# Patient Record
Sex: FEMALE | Race: WHITE | NOT HISPANIC OR LATINO
[De-identification: names, ages, dates, MRNs, and addresses within clinical notes are randomized per-mention and may not be internally consistent; named-entity substitution may affect disease eponyms.]

---

## 2017-10-19 ENCOUNTER — APPOINTMENT (OUTPATIENT)
Dept: INTERNAL MEDICINE | Facility: CLINIC | Age: 47
End: 2017-10-19
Payer: COMMERCIAL

## 2017-10-19 VITALS — TEMPERATURE: 98.5 F | SYSTOLIC BLOOD PRESSURE: 128 MMHG | DIASTOLIC BLOOD PRESSURE: 82 MMHG

## 2017-10-19 VITALS
BODY MASS INDEX: 21.21 KG/M2 | HEIGHT: 66 IN | WEIGHT: 132 LBS | DIASTOLIC BLOOD PRESSURE: 90 MMHG | TEMPERATURE: 98.3 F | SYSTOLIC BLOOD PRESSURE: 130 MMHG

## 2017-10-19 DIAGNOSIS — R92.2 INCONCLUSIVE MAMMOGRAM: ICD-10-CM

## 2017-10-19 DIAGNOSIS — Z87.898 PERSONAL HISTORY OF OTHER SPECIFIED CONDITIONS: ICD-10-CM

## 2017-10-19 DIAGNOSIS — F41.9 ANXIETY DISORDER, UNSPECIFIED: ICD-10-CM

## 2017-10-19 LAB
BILIRUB UR QL STRIP: NEGATIVE
CLARITY UR: CLEAR
COLLECTION METHOD: NORMAL
GLUCOSE UR-MCNC: NEGATIVE
HCG UR QL: 0.2 EU/DL
HGB UR QL STRIP.AUTO: NEGATIVE
KETONES UR-MCNC: NEGATIVE
LEUKOCYTE ESTERASE UR QL STRIP: NEGATIVE
NITRITE UR QL STRIP: NEGATIVE
PH UR STRIP: 6
PROT UR STRIP-MCNC: NEGATIVE
SP GR UR STRIP: 1.02

## 2017-10-19 PROCEDURE — 81003 URINALYSIS AUTO W/O SCOPE: CPT | Mod: QW

## 2017-10-19 PROCEDURE — 99214 OFFICE O/P EST MOD 30 MIN: CPT | Mod: 25

## 2017-10-19 RX ORDER — LORATADINE 5 MG/5 ML
0.05 SOLUTION, ORAL ORAL
Refills: 0 | Status: ACTIVE | COMMUNITY
Start: 2017-10-19

## 2017-10-19 RX ORDER — CHLORHEXIDINE GLUCONATE 4 %
LIQUID (ML) TOPICAL DAILY
Qty: 90 | Refills: 1 | Status: ACTIVE | COMMUNITY
Start: 2017-10-19

## 2017-10-19 RX ORDER — POLYETHYLENE GLYCOL 400 AND PROPYLENE GLYCOL 4; 3 MG/ML; MG/ML
0.4-0.3 SOLUTION/ DROPS OPHTHALMIC
Refills: 0 | Status: ACTIVE | COMMUNITY
Start: 2017-10-19

## 2018-04-11 ENCOUNTER — APPOINTMENT (OUTPATIENT)
Dept: INTERNAL MEDICINE | Facility: CLINIC | Age: 48
End: 2018-04-11
Payer: COMMERCIAL

## 2018-04-11 VITALS — TEMPERATURE: 99 F | SYSTOLIC BLOOD PRESSURE: 114 MMHG | DIASTOLIC BLOOD PRESSURE: 66 MMHG

## 2018-04-11 VITALS
TEMPERATURE: 98.4 F | WEIGHT: 127 LBS | SYSTOLIC BLOOD PRESSURE: 110 MMHG | BODY MASS INDEX: 20.41 KG/M2 | HEIGHT: 66 IN | DIASTOLIC BLOOD PRESSURE: 70 MMHG | HEART RATE: 92 BPM | OXYGEN SATURATION: 98 %

## 2018-04-11 DIAGNOSIS — Z00.00 ENCOUNTER FOR GENERAL ADULT MEDICAL EXAMINATION W/OUT ABNORMAL FINDINGS: ICD-10-CM

## 2018-04-11 DIAGNOSIS — R10.11 RIGHT UPPER QUADRANT PAIN: ICD-10-CM

## 2018-04-11 PROCEDURE — 99213 OFFICE O/P EST LOW 20 MIN: CPT

## 2018-04-11 RX ORDER — AMLODIPINE BESYLATE 2.5 MG/1
2.5 TABLET ORAL
Qty: 30 | Refills: 0 | Status: DISCONTINUED | COMMUNITY
Start: 2018-03-11 | End: 2018-04-11

## 2019-01-09 ENCOUNTER — LABORATORY RESULT (OUTPATIENT)
Age: 49
End: 2019-01-09

## 2019-01-23 ENCOUNTER — NON-APPOINTMENT (OUTPATIENT)
Age: 49
End: 2019-01-23

## 2019-01-23 ENCOUNTER — APPOINTMENT (OUTPATIENT)
Dept: INTERNAL MEDICINE | Facility: CLINIC | Age: 49
End: 2019-01-23
Payer: COMMERCIAL

## 2019-01-23 VITALS — DIASTOLIC BLOOD PRESSURE: 86 MMHG | SYSTOLIC BLOOD PRESSURE: 128 MMHG

## 2019-01-23 VITALS
WEIGHT: 126 LBS | OXYGEN SATURATION: 98 % | BODY MASS INDEX: 20.25 KG/M2 | HEART RATE: 89 BPM | TEMPERATURE: 98.3 F | HEIGHT: 66 IN | SYSTOLIC BLOOD PRESSURE: 120 MMHG | DIASTOLIC BLOOD PRESSURE: 70 MMHG

## 2019-01-23 DIAGNOSIS — Z82.49 FAMILY HISTORY OF ISCHEMIC HEART DISEASE AND OTHER DISEASES OF THE CIRCULATORY SYSTEM: ICD-10-CM

## 2019-01-23 DIAGNOSIS — R10.30 LOWER ABDOMINAL PAIN, UNSPECIFIED: ICD-10-CM

## 2019-01-23 DIAGNOSIS — Z87.898 PERSONAL HISTORY OF OTHER SPECIFIED CONDITIONS: ICD-10-CM

## 2019-01-23 DIAGNOSIS — Z84.1 FAMILY HISTORY OF DISORDERS OF KIDNEY AND URETER: ICD-10-CM

## 2019-01-23 DIAGNOSIS — Z83.79 FAMILY HISTORY OF OTHER DISEASES OF THE DIGESTIVE SYSTEM: ICD-10-CM

## 2019-01-23 DIAGNOSIS — Z83.6 FAMILY HISTORY OF OTHER DISEASES OF THE RESPIRATORY SYSTEM: ICD-10-CM

## 2019-01-23 PROCEDURE — 93000 ELECTROCARDIOGRAM COMPLETE: CPT

## 2019-01-23 PROCEDURE — 82270 OCCULT BLOOD FECES: CPT

## 2019-01-23 PROCEDURE — 99396 PREV VISIT EST AGE 40-64: CPT | Mod: 25

## 2019-01-23 NOTE — PHYSICAL EXAM
[No Acute Distress] : no acute distress [Well Nourished] : well nourished [Well Developed] : well developed [Normal Sclera/Conjunctiva] : normal sclera/conjunctiva [PERRL] : pupils equal round and reactive to light [EOMI] : extraocular movements intact [Normal Outer Ear/Nose] : the outer ears and nose were normal in appearance [Normal Oropharynx] : the oropharynx was normal [Normal TMs] : both tympanic membranes were normal [Normal Nasal Mucosa] : the nasal mucosa was normal [No JVD] : no jugular venous distention [Supple] : supple [No Lymphadenopathy] : no lymphadenopathy [No Respiratory Distress] : no respiratory distress  [Clear to Auscultation] : lungs were clear to auscultation bilaterally [Normal Rate] : normal rate  [Regular Rhythm] : with a regular rhythm [Normal S1, S2] : normal S1 and S2 [No Carotid Bruits] : no carotid bruits [Pedal Pulses Present] : the pedal pulses are present [No Edema] : there was no peripheral edema [No Extremity Clubbing/Cyanosis] : no extremity clubbing/cyanosis [Normal Appearance] : normal in appearance [No Masses] : no palpable masses [No Nipple Discharge] : no nipple discharge [No Axillary Lymphadenopathy] : no axillary lymphadenopathy [Soft] : abdomen soft [Non Tender] : non-tender [Non-distended] : non-distended [Normal Bowel Sounds] : normal bowel sounds [Normal Sphincter Tone] : normal sphincter tone [No Mass] : no mass [Stool Occult Blood] : stool negative for occult blood [Normal Supraclavicular Nodes] : no supraclavicular lymphadenopathy [Normal Axillary Nodes] : no axillary lymphadenopathy [Normal Posterior Cervical Nodes] : no posterior cervical lymphadenopathy [Normal Anterior Cervical Nodes] : no anterior cervical lymphadenopathy [No CVA Tenderness] : no CVA  tenderness [No Spinal Tenderness] : no spinal tenderness [No Joint Swelling] : no joint swelling [Grossly Normal Strength/Tone] : grossly normal strength/tone [No Rash] : no rash [Normal Gait] : normal gait [Coordination Grossly Intact] : coordination grossly intact [No Focal Deficits] : no focal deficits [Speech Grossly Normal] : speech grossly normal [Normal Affect] : the affect was normal [Alert and Oriented x3] : oriented to person, place, and time [Normal Mood] : the mood was normal [de-identified] : female in stated age,

## 2019-01-23 NOTE — HEALTH RISK ASSESSMENT
[Good] : ~his/her~ current health as good [Very Good] : ~his/her~  mood as very good [One fall no injury in past year] : Patient reported one fall in the past year without injury [0] : 2) Feeling down, depressed, or hopeless: Not at all (0) [None] : None [With Family] : lives with family [# of Members in Household ___] :  household currently consist of [unfilled] member(s) [College] : College [] :  [# Of Children ___] : has [unfilled] children [Feels Safe at Home] : Feels safe at home [Fully functional (bathing, dressing, toileting, transferring, walking, feeding)] : Fully functional (bathing, dressing, toileting, transferring, walking, feeding) [Fully functional (using the telephone, shopping, preparing meals, housekeeping, doing laundry, using] : Fully functional and needs no help or supervision to perform IADLs (using the telephone, shopping, preparing meals, housekeeping, doing laundry, using transportation, managing medications and managing finances) [Smoke Detector] : smoke detector [Carbon Monoxide Detector] : carbon monoxide detector [Seat Belt] :  uses seat belt [] : No [Change in mental status noted] : No change in mental status noted [Reports changes in hearing] : Reports no changes in hearing [Reports changes in vision] : Reports no changes in vision [Reports changes in dental health] : Reports no changes in dental health [MammogramDate] : 4/2018 [MammogramComments] : US of breast - 4/2018 [PapSmearDate] : 6/2018 [BoneDensityDate] : Never [ColonoscopyDate] : Never [de-identified] : Marianela [de-identified] : eye exam - 2016 [de-identified] : dentist - 12/2017

## 2019-01-23 NOTE — HISTORY OF PRESENT ILLNESS
[de-identified] : Pt presented for PE.  Last PE was 11/2016.  \par \par She has weaned herself off Zoloft about 2 years ago, and is feeling OK.  \par \par She has been off Bystolic for about 3-4 years.\par \par Pt was in ED for kidney stone in 10/2018.  She went to ED twice.  Kidney stone was found on US, but not on the CT scan, and pt was given ABx and increase fluid.  She d/u'ed with  1-2 weeks after that.\par \par She hasn't have any pancreatitis or abdominal since 3/2018, she very rarely drinks alcohol anymore. \par \par Pt had Flu vaccine at the urgent care center.

## 2019-01-23 NOTE — PAST MEDICAL HISTORY
[Menstruating] : menstruating [Menarche Age ____] : age at menarche was [unfilled] [Approximately ___] : the LMP was approximately [unfilled] [Excessive Bleeding] : there was excessive bleeding [Regular Cycle Intervals] : have been regular [Total Preg ___] : G[unfilled] [Live Births ___] : P[unfilled]

## 2019-01-23 NOTE — REVIEW OF SYSTEMS
[Nocturia] : nocturia [Negative] : Heme/Lymph [Fever] : no fever [Chills] : no chills [Fatigue] : no fatigue [Recent Change In Weight] : ~T no recent weight change [Chest Pain] : no chest pain [Palpitations] : no palpitations [Lower Ext Edema] : no lower extremity edema [Shortness Of Breath] : no shortness of breath [Wheezing] : no wheezing [Cough] : no cough [Dyspnea on Exertion] : no dyspnea on exertion [Abdominal Pain] : no abdominal pain [Nausea] : no nausea [Constipation] : no constipation [Diarrhea] : diarrhea [Vomiting] : no vomiting [Heartburn] : no heartburn [Melena] : no melena [Dysuria] : no dysuria [Incontinence] : no incontinence [Hematuria] : no hematuria [Joint Pain] : no joint pain [Joint Stiffness] : no joint stiffness [Joint Swelling] : no joint swelling [Muscle Pain] : no muscle pain [Back Pain] : no back pain [Itching] : no itching [Mole Changes] : no mole changes [Skin Rash] : no skin rash [Headache] : no headache [Dizziness] : no dizziness [Fainting] : no fainting [Unsteady Walking] : no ataxia [Insomnia] : no insomnia [Anxiety] : no anxiety [Depression] : no depression [Easy Bleeding] : no easy bleeding [Easy Bruising] : no easy bruising [Swollen Glands] : no swollen glands [FreeTextEntry2] : No formal exercise,  [FreeTextEntry7] : Occ mild abdominal when eating heavy food, [FreeTextEntry8] : Nocturia of 1 X per night,

## 2019-02-27 ENCOUNTER — APPOINTMENT (OUTPATIENT)
Dept: GASTROENTEROLOGY | Facility: CLINIC | Age: 49
End: 2019-02-27
Payer: COMMERCIAL

## 2019-02-27 VITALS
SYSTOLIC BLOOD PRESSURE: 110 MMHG | OXYGEN SATURATION: 95 % | TEMPERATURE: 98.4 F | BODY MASS INDEX: 20.09 KG/M2 | HEART RATE: 79 BPM | WEIGHT: 125 LBS | DIASTOLIC BLOOD PRESSURE: 80 MMHG | HEIGHT: 66 IN

## 2019-02-27 DIAGNOSIS — R14.0 ABDOMINAL DISTENSION (GASEOUS): ICD-10-CM

## 2019-02-27 PROCEDURE — 99213 OFFICE O/P EST LOW 20 MIN: CPT

## 2019-02-27 NOTE — REVIEW OF SYSTEMS
[Fever] : no fever [Chills] : no chills [As Noted in HPI] : as noted in HPI [Negative] : Musculoskeletal

## 2019-02-27 NOTE — PHYSICAL EXAM
[General Appearance - Alert] : alert [General Appearance - In No Acute Distress] : in no acute distress [Sclera] : the sclera and conjunctiva were normal [Neck Appearance] : the appearance of the neck was normal [Auscultation Breath Sounds / Voice Sounds] : lungs were clear to auscultation bilaterally [Heart Rate And Rhythm] : heart rate was normal and rhythm regular [Heart Sounds] : normal S1 and S2 [Murmurs] : no murmurs [Edema] : there was no peripheral edema [Bowel Sounds] : normal bowel sounds [Abdomen Soft] : soft [Abdomen Tenderness] : non-tender [] : no hepato-splenomegaly [Abdomen Mass (___ Cm)] : no abdominal mass palpated [Abnormal Walk] : normal gait [Musculoskeletal - Swelling] : no joint swelling seen [Skin Color & Pigmentation] : normal skin color and pigmentation [Skin Turgor] : normal skin turgor [Oriented To Time, Place, And Person] : oriented to person, place, and time [Impaired Insight] : insight and judgment were intact

## 2019-02-27 NOTE — ASSESSMENT
[FreeTextEntry1] : Patient with known history of chronic pancreatitis now complaining of episodes of lower abdominal cramps with episodes of diarrhea. She also complains of chronic bloating. Testing in the past for celiac has been negative. She has been stable as far as pancreatitis is concerned.\par \par Her examination today is unremarkable.\par \par Plan\par Check celiac panel\par Colonoscopy to be scheduled risk benefits discussed along with split prep

## 2019-03-01 LAB
ENDOMYSIUM IGA SER QL: NEGATIVE
ENDOMYSIUM IGA TITR SER: NORMAL
GLIADIN IGA SER QL: 6.9 UNITS
GLIADIN IGG SER QL: <5 UNITS
GLIADIN PEPTIDE IGA SER-ACNC: NEGATIVE
GLIADIN PEPTIDE IGG SER-ACNC: NEGATIVE
IGA SER QL IEP: 263 MG/DL
TTG IGA SER IA-ACNC: <1.2 U/ML
TTG IGA SER-ACNC: NEGATIVE
TTG IGG SER IA-ACNC: 2.7 U/ML
TTG IGG SER IA-ACNC: NEGATIVE

## 2019-04-16 ENCOUNTER — APPOINTMENT (OUTPATIENT)
Dept: GASTROENTEROLOGY | Facility: AMBULATORY MEDICAL SERVICES | Age: 49
End: 2019-04-16

## 2019-06-30 ENCOUNTER — EMERGENCY (EMERGENCY)
Facility: HOSPITAL | Age: 49
LOS: 1 days | Discharge: HOME | End: 2019-06-30
Attending: EMERGENCY MEDICINE | Admitting: EMERGENCY MEDICINE
Payer: COMMERCIAL

## 2019-06-30 VITALS
TEMPERATURE: 98 F | HEART RATE: 94 BPM | SYSTOLIC BLOOD PRESSURE: 136 MMHG | DIASTOLIC BLOOD PRESSURE: 102 MMHG | OXYGEN SATURATION: 100 % | RESPIRATION RATE: 18 BRPM

## 2019-06-30 DIAGNOSIS — R69 ILLNESS, UNSPECIFIED: ICD-10-CM

## 2019-06-30 DIAGNOSIS — F43.23 ADJUSTMENT DISORDER WITH MIXED ANXIETY AND DEPRESSED MOOD: ICD-10-CM

## 2019-06-30 LAB
ALBUMIN SERPL ELPH-MCNC: 4.6 G/DL — SIGNIFICANT CHANGE UP (ref 3.3–5)
ALP SERPL-CCNC: 50 U/L — SIGNIFICANT CHANGE UP (ref 40–120)
ALT FLD-CCNC: 25 U/L — SIGNIFICANT CHANGE UP (ref 4–33)
ANION GAP SERPL CALC-SCNC: 16 MMO/L — HIGH (ref 7–14)
APAP SERPL-MCNC: < 15 UG/ML — LOW (ref 15–25)
AST SERPL-CCNC: 31 U/L — SIGNIFICANT CHANGE UP (ref 4–32)
BASOPHILS # BLD AUTO: 0.03 K/UL — SIGNIFICANT CHANGE UP (ref 0–0.2)
BASOPHILS NFR BLD AUTO: 0.8 % — SIGNIFICANT CHANGE UP (ref 0–2)
BILIRUB SERPL-MCNC: 0.4 MG/DL — SIGNIFICANT CHANGE UP (ref 0.2–1.2)
BUN SERPL-MCNC: 18 MG/DL — SIGNIFICANT CHANGE UP (ref 7–23)
CALCIUM SERPL-MCNC: 9.2 MG/DL — SIGNIFICANT CHANGE UP (ref 8.4–10.5)
CHLORIDE SERPL-SCNC: 103 MMOL/L — SIGNIFICANT CHANGE UP (ref 98–107)
CO2 SERPL-SCNC: 25 MMOL/L — SIGNIFICANT CHANGE UP (ref 22–31)
CREAT SERPL-MCNC: 0.85 MG/DL — SIGNIFICANT CHANGE UP (ref 0.5–1.3)
EOSINOPHIL # BLD AUTO: 0.02 K/UL — SIGNIFICANT CHANGE UP (ref 0–0.5)
EOSINOPHIL NFR BLD AUTO: 0.5 % — SIGNIFICANT CHANGE UP (ref 0–6)
ETHANOL BLD-MCNC: 240 MG/DL — HIGH
GLUCOSE SERPL-MCNC: 113 MG/DL — HIGH (ref 70–99)
HCG SERPL-ACNC: < 5 MIU/ML — SIGNIFICANT CHANGE UP
HCT VFR BLD CALC: 39.7 % — SIGNIFICANT CHANGE UP (ref 34.5–45)
HGB BLD-MCNC: 12.9 G/DL — SIGNIFICANT CHANGE UP (ref 11.5–15.5)
IMM GRANULOCYTES NFR BLD AUTO: 0.3 % — SIGNIFICANT CHANGE UP (ref 0–1.5)
LYMPHOCYTES # BLD AUTO: 1.24 K/UL — SIGNIFICANT CHANGE UP (ref 1–3.3)
LYMPHOCYTES # BLD AUTO: 32.4 % — SIGNIFICANT CHANGE UP (ref 13–44)
MCHC RBC-ENTMCNC: 28.7 PG — SIGNIFICANT CHANGE UP (ref 27–34)
MCHC RBC-ENTMCNC: 32.5 % — SIGNIFICANT CHANGE UP (ref 32–36)
MCV RBC AUTO: 88.4 FL — SIGNIFICANT CHANGE UP (ref 80–100)
MONOCYTES # BLD AUTO: 0.37 K/UL — SIGNIFICANT CHANGE UP (ref 0–0.9)
MONOCYTES NFR BLD AUTO: 9.7 % — SIGNIFICANT CHANGE UP (ref 2–14)
NEUTROPHILS # BLD AUTO: 2.16 K/UL — SIGNIFICANT CHANGE UP (ref 1.8–7.4)
NEUTROPHILS NFR BLD AUTO: 56.3 % — SIGNIFICANT CHANGE UP (ref 43–77)
NRBC # FLD: 0 K/UL — SIGNIFICANT CHANGE UP (ref 0–0)
PLATELET # BLD AUTO: 227 K/UL — SIGNIFICANT CHANGE UP (ref 150–400)
PMV BLD: 9.7 FL — SIGNIFICANT CHANGE UP (ref 7–13)
POTASSIUM SERPL-MCNC: 3.9 MMOL/L — SIGNIFICANT CHANGE UP (ref 3.5–5.3)
POTASSIUM SERPL-SCNC: 3.9 MMOL/L — SIGNIFICANT CHANGE UP (ref 3.5–5.3)
PROT SERPL-MCNC: 7.8 G/DL — SIGNIFICANT CHANGE UP (ref 6–8.3)
RBC # BLD: 4.49 M/UL — SIGNIFICANT CHANGE UP (ref 3.8–5.2)
RBC # FLD: 14.5 % — SIGNIFICANT CHANGE UP (ref 10.3–14.5)
SALICYLATES SERPL-MCNC: < 5 MG/DL — LOW (ref 15–30)
SODIUM SERPL-SCNC: 144 MMOL/L — SIGNIFICANT CHANGE UP (ref 135–145)
TSH SERPL-MCNC: 0.38 UIU/ML — SIGNIFICANT CHANGE UP (ref 0.27–4.2)
WBC # BLD: 3.83 K/UL — SIGNIFICANT CHANGE UP (ref 3.8–10.5)
WBC # FLD AUTO: 3.83 K/UL — SIGNIFICANT CHANGE UP (ref 3.8–10.5)

## 2019-06-30 PROCEDURE — 99285 EMERGENCY DEPT VISIT HI MDM: CPT

## 2019-06-30 PROCEDURE — 90792 PSYCH DIAG EVAL W/MED SRVCS: CPT
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
## 2019-06-30 NOTE — ED BEHAVIORAL HEALTH ASSESSMENT NOTE - DESCRIPTION
hypertension cooperative, in good behavioral control    Vital Signs Last 24 Hrs  T(C): 36.6 (30 Jun 2019 08:09), Max: 36.6 (30 Jun 2019 08:09)  T(F): 97.8 (30 Jun 2019 08:09), Max: 97.8 (30 Jun 2019 08:09)  HR: 94 (30 Jun 2019 08:09) (94 - 94)  BP: 136/102 (30 Jun 2019 08:09) (136/102 - 136/102)  BP(mean): --  RR: 18 (30 Jun 2019 08:09) (18 - 18)  SpO2: 100% (30 Jun 2019 08:09) (100% - 100%) see HPI

## 2019-06-30 NOTE — ED ADULT NURSE NOTE - NSIMPLEMENTINTERV_GEN_ALL_ED
Implemented All Fall Risk Interventions:  [place]atin to call system. Call bell, personal items and telephone within reach. Instruct patient to call for assistance. Room bathroom lighting operational. Non-slip footwear when patient is off stretcher. Physically safe environment: no spills, clutter or unnecessary equipment. Stretcher in lowest position, wheels locked, appropriate side rails in place. Provide visual cue, wrist band, yellow gown, etc. Monitor gait and stability. Monitor for mental status changes and reorient to person, place, and time. Review medications for side effects contributing to fall risk. Reinforce activity limits and safety measures with patient and family.

## 2019-06-30 NOTE — ED ADULT NURSE NOTE - OBJECTIVE STATEMENT
Pt to behavioral health. Pt c/o depression. Pt with bruising to body. MD made aware. Pt tearful. Will continue to monitor.
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
## 2019-06-30 NOTE — ED PROVIDER NOTE - NSFOLLOWUPINSTRUCTIONS_ED_ALL_ED_FT
Please follow up with [facility] [place] [facility] crisis center. Refer to the pamphlet provided to you for phone number and address.  Please do not drink alcohol.  Please return to emergency department or call 911 if you have any thoughts of wanting to harm yourself or anyone else.   Please return for any pain, or any new/worse problems.

## 2019-06-30 NOTE — ED BEHAVIORAL HEALTH ASSESSMENT NOTE - DIFFERENTIAL
adjustment disorder with mixed anxiety and depressed mood  mdd  alcohol use disorder  substance-induced mood disorder   borderline traits

## 2019-06-30 NOTE — ED BEHAVIORAL HEALTH ASSESSMENT NOTE - RISK ASSESSMENT
Protective factors include no suicide attempts, no violence history, no access to guns, identifies reasons for living, engages in safety planning, willingness to seek help, no suicidal ideation or homicidal ideation, hopefulness for future.   Risk factors include history of substance misuse, impulsivity, marital issues.  Pt is not at acutely elevated risk of harm to self or others.

## 2019-06-30 NOTE — ED PROVIDER NOTE - PHYSICAL EXAMINATION
GEN - tearful, well appearing; A+O x3   HEAD - NC/AT   EYES- PERRL, EOMI  ENT: Airway patent, mmm, Oral cavity and pharynx normal.  NECK: Neck supple  PULMONARY - CTA b/l, symmetric breath sounds.   CARDIAC -s1s2, RRR, no M,G,R  ABDOMEN - nt/nd  EXTREMITIES - FROM  SKIN - no rash or bruising   NEUROLOGIC - alert, speech clear, no focal deficits  PSYCH -tearful at times during interview, cooperative, calm GEN - tearful, well appearing; A+O x3   HEAD - NC/AT   EYES- PERRL, EOMI  ENT: Airway patent, mmm, Oral cavity and pharynx normal.  NECK: Neck supple  PULMONARY - CTA b/l, symmetric breath sounds.   CARDIAC -s1s2, RRR, no M,G,R  ABDOMEN - nt/nd  EXTREMITIES - FROM  SKIN - multiple scattered bruises to chest/extremities. no crepitus/abrasions.   NEUROLOGIC - alert, speech clear, no focal deficits  PSYCH -tearful at times during interview, cooperative, calm
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
## 2019-06-30 NOTE — ED PROVIDER NOTE - NSFOLLOWUPCLINICS_GEN_ALL_ED_FT
[facility]  Behavioral Health  [address] [place] [address]  Phone: [phone]  Fax:   Follow Up Time:

## 2019-06-30 NOTE — ED PROVIDER NOTE - NS ED ROS FT
ROS:  GENERAL: No fever, no chills  EYES: no change in vision  HEENT: no trouble swallowing, no trouble speaking  CARDIAC: no chest pain  PULMONARY: no cough, no shortness of breath  GI: no abdominal pain  : No dysuria, no frequency, no change in appearance, or odor of urine  SKIN: no rashes  NEURO: no headache, no weakness  MSK: No joint pain  PSYCH: +si, +depression

## 2019-11-08 ENCOUNTER — APPOINTMENT (OUTPATIENT)
Dept: INTERNAL MEDICINE | Facility: CLINIC | Age: 49
End: 2019-11-08
Payer: COMMERCIAL

## 2019-11-08 VITALS
SYSTOLIC BLOOD PRESSURE: 162 MMHG | BODY MASS INDEX: 18.96 KG/M2 | DIASTOLIC BLOOD PRESSURE: 84 MMHG | HEIGHT: 66 IN | WEIGHT: 118 LBS | OXYGEN SATURATION: 98 % | HEART RATE: 84 BPM | TEMPERATURE: 98.1 F

## 2019-11-08 VITALS — SYSTOLIC BLOOD PRESSURE: 122 MMHG | DIASTOLIC BLOOD PRESSURE: 84 MMHG

## 2019-11-08 DIAGNOSIS — Z23 ENCOUNTER FOR IMMUNIZATION: ICD-10-CM

## 2019-11-08 PROCEDURE — G0008: CPT

## 2019-11-08 PROCEDURE — 90686 IIV4 VACC NO PRSV 0.5 ML IM: CPT

## 2019-11-08 PROCEDURE — 99214 OFFICE O/P EST MOD 30 MIN: CPT | Mod: 25

## 2019-11-08 RX ORDER — OMEGA-3/DHA/EPA/FISH OIL 300-1000MG
1000 CAPSULE ORAL DAILY
Refills: 0 | Status: COMPLETED | COMMUNITY
Start: 2019-01-23 | End: 2019-11-08

## 2019-11-11 NOTE — HISTORY OF PRESENT ILLNESS
[Post-hospitalization from ___ Hospital] : Post-hospitalization from [unfilled] Hospital [Discharged on ___] : discharged on [unfilled] [Admitted on: ___] : The patient was admitted on [unfilled] [Discharge Summary] : discharge summary [Pertinent Labs] : pertinent labs [Med Reconciliation] : medication reconciliation has been completed [Discharge Med List] : discharge medication list [Patient Contacted By: ____] : and contacted by [unfilled] [FreeTextEntry2] : Pt was admitted for pancreatitis last week.  She was in [place] the week before that and thought that she was eating heavier than usual.  She is scheduled for ERCP 11/18/2019.\par \par She is feeling better now.  She is back to her usual self.  She's able to go back to her usual diet.   She denied any alcohol intake.

## 2019-11-11 NOTE — PHYSICAL EXAM
[No Acute Distress] : no acute distress [Well Nourished] : well nourished [Well Developed] : well developed [No JVD] : no jugular venous distention [Supple] : supple [No Respiratory Distress] : no respiratory distress  [Clear to Auscultation] : lungs were clear to auscultation bilaterally [Normal Rate] : normal rate  [Regular Rhythm] : with a regular rhythm [Normal S1, S2] : normal S1 and S2 [No Edema] : there was no peripheral edema [Soft] : abdomen soft [No Extremity Clubbing/Cyanosis] : no extremity clubbing/cyanosis [Non Tender] : non-tender [Non-distended] : non-distended [Normal Bowel Sounds] : normal bowel sounds [Normal Supraclavicular Nodes] : no supraclavicular lymphadenopathy [Normal Posterior Cervical Nodes] : no posterior cervical lymphadenopathy [Normal Anterior Cervical Nodes] : no anterior cervical lymphadenopathy [No CVA Tenderness] : no CVA  tenderness [No Spinal Tenderness] : no spinal tenderness [No Joint Swelling] : no joint swelling [Grossly Normal Strength/Tone] : grossly normal strength/tone [Speech Grossly Normal] : speech grossly normal [Normal Affect] : the affect was normal [Alert and Oriented x3] : oriented to person, place, and time [Normal Mood] : the mood was normal [Comprehensive Foot Exam Normal] : Right and left foot were examined and both feet are normal. No ulcers in either foot. Toes are normal and with full ROM.  Normal tactile sensation with monofilament testing throughout both feet [de-identified] : female in stated age,

## 2019-12-11 ENCOUNTER — APPOINTMENT (OUTPATIENT)
Dept: INTERNAL MEDICINE | Facility: CLINIC | Age: 49
End: 2019-12-11
Payer: COMMERCIAL

## 2019-12-11 VITALS
SYSTOLIC BLOOD PRESSURE: 130 MMHG | BODY MASS INDEX: 18.8 KG/M2 | OXYGEN SATURATION: 90 % | DIASTOLIC BLOOD PRESSURE: 90 MMHG | WEIGHT: 117 LBS | TEMPERATURE: 97.9 F | HEIGHT: 66 IN | HEART RATE: 82 BPM

## 2019-12-11 VITALS — SYSTOLIC BLOOD PRESSURE: 120 MMHG | DIASTOLIC BLOOD PRESSURE: 82 MMHG

## 2019-12-11 PROCEDURE — 99214 OFFICE O/P EST MOD 30 MIN: CPT

## 2019-12-11 NOTE — ASSESSMENT
[FreeTextEntry1] : Patient is due for a physical exam at the end of January 2020, I gave her prescription to get routine blood test prior to her visit.\par \par Patient is also due for a screening colonoscopy, so we will discuss with GI regarding the timing of this procedure.

## 2019-12-11 NOTE — HISTORY OF PRESENT ILLNESS
[de-identified] : Pt presented f/u.  She had EUS with stent placed in bile duct on 12/5/2019, and was discharged home, but pt did not feel well, and went back to ED the next day, labs were unremarkable, and then discharged from ED.  Pt has been on a very light diet, and is finally feeling in the last 1-2 days.  She is starting to eat more.  No F/S/C, N/V/D.  She was given ABx for 24 hours after the EUS.  She will have a Xray in 2 weeks to see if the stent has passed.\par \par Pt had elevated BP when she was having EUS and when she was in the ED, it was 160-180/100-115 it improved after that.

## 2019-12-11 NOTE — PHYSICAL EXAM
[No Acute Distress] : no acute distress [Well Nourished] : well nourished [Well Developed] : well developed [Clear to Auscultation] : lungs were clear to auscultation bilaterally [No Respiratory Distress] : no respiratory distress  [Regular Rhythm] : with a regular rhythm [Normal Rate] : normal rate  [Normal S1, S2] : normal S1 and S2 [No Edema] : there was no peripheral edema [Soft] : abdomen soft [No Extremity Clubbing/Cyanosis] : no extremity clubbing/cyanosis [Non Tender] : non-tender [Non-distended] : non-distended [Normal Bowel Sounds] : normal bowel sounds [No Spinal Tenderness] : no spinal tenderness [No CVA Tenderness] : no CVA  tenderness [No Joint Swelling] : no joint swelling [Grossly Normal Strength/Tone] : grossly normal strength/tone [Speech Grossly Normal] : speech grossly normal [Alert and Oriented x3] : oriented to person, place, and time [Normal Affect] : the affect was normal [Normal Mood] : the mood was normal [de-identified] : female in stated age,

## 2021-03-10 ENCOUNTER — LABORATORY RESULT (OUTPATIENT)
Age: 51
End: 2021-03-10

## 2021-03-11 ENCOUNTER — LABORATORY RESULT (OUTPATIENT)
Age: 51
End: 2021-03-11

## 2021-03-12 ENCOUNTER — APPOINTMENT (OUTPATIENT)
Dept: INTERNAL MEDICINE | Facility: CLINIC | Age: 51
End: 2021-03-12
Payer: COMMERCIAL

## 2021-03-12 VITALS
WEIGHT: 118 LBS | HEART RATE: 79 BPM | DIASTOLIC BLOOD PRESSURE: 103 MMHG | HEIGHT: 66 IN | SYSTOLIC BLOOD PRESSURE: 147 MMHG | OXYGEN SATURATION: 97 % | TEMPERATURE: 97.9 F | BODY MASS INDEX: 18.96 KG/M2

## 2021-03-12 VITALS — SYSTOLIC BLOOD PRESSURE: 142 MMHG | DIASTOLIC BLOOD PRESSURE: 88 MMHG

## 2021-03-12 DIAGNOSIS — H04.129 DRY EYE SYNDROME OF UNSPECIFIED LACRIMAL GLAND: ICD-10-CM

## 2021-03-12 DIAGNOSIS — Z87.898 PERSONAL HISTORY OF OTHER SPECIFIED CONDITIONS: ICD-10-CM

## 2021-03-12 DIAGNOSIS — J30.9 ALLERGIC RHINITIS, UNSPECIFIED: ICD-10-CM

## 2021-03-12 DIAGNOSIS — R82.90 UNSPECIFIED ABNORMAL FINDINGS IN URINE: ICD-10-CM

## 2021-03-12 DIAGNOSIS — I10 ESSENTIAL (PRIMARY) HYPERTENSION: ICD-10-CM

## 2021-03-12 LAB
25(OH)D3 SERPL-MCNC: 16 NG/ML
ALBUMIN SERPL ELPH-MCNC: 4.4 G/DL
ALP BLD-CCNC: 70 U/L
ALT SERPL-CCNC: 49 U/L
AMYLASE/CREAT SERPL: 47 U/L
ANION GAP SERPL CALC-SCNC: 15 MMOL/L
APPEARANCE: CLEAR
AST SERPL-CCNC: 59 U/L
BASOPHILS # BLD AUTO: 0.04 K/UL
BASOPHILS NFR BLD AUTO: 0.9 %
BILIRUB SERPL-MCNC: 0.8 MG/DL
BILIRUBIN URINE: NEGATIVE
BLOOD URINE: ABNORMAL
BUN SERPL-MCNC: 13 MG/DL
CALCIUM SERPL-MCNC: 8.6 MG/DL
CHLORIDE SERPL-SCNC: 102 MMOL/L
CHOLEST SERPL-MCNC: 178 MG/DL
CO2 SERPL-SCNC: 23 MMOL/L
COLOR: YELLOW
CREAT SERPL-MCNC: 0.85 MG/DL
EOSINOPHIL # BLD AUTO: 0.03 K/UL
EOSINOPHIL NFR BLD AUTO: 0.7 %
FOLATE SERPL-MCNC: 3.2 NG/ML
GLUCOSE QUALITATIVE U: NEGATIVE
GLUCOSE SERPL-MCNC: 92 MG/DL
HCT VFR BLD CALC: 39.6 %
HDLC SERPL-MCNC: 68 MG/DL
HGB BLD-MCNC: 13.2 G/DL
IMM GRANULOCYTES NFR BLD AUTO: 0.2 %
KETONES URINE: NEGATIVE
LDLC SERPL CALC-MCNC: NORMAL MG/DL
LEUKOCYTE ESTERASE URINE: NEGATIVE
LPL SERPL-CCNC: 39 U/L
LYMPHOCYTES # BLD AUTO: 1.27 K/UL
LYMPHOCYTES NFR BLD AUTO: 28.7 %
MAN DIFF?: NORMAL
MCHC RBC-ENTMCNC: 30.4 PG
MCHC RBC-ENTMCNC: 33.3 GM/DL
MCV RBC AUTO: 91.2 FL
MONOCYTES # BLD AUTO: 0.31 K/UL
MONOCYTES NFR BLD AUTO: 7 %
NEUTROPHILS # BLD AUTO: 2.77 K/UL
NEUTROPHILS NFR BLD AUTO: 62.5 %
NITRITE URINE: NEGATIVE
NONHDLC SERPL-MCNC: 110 MG/DL
PH URINE: 6.5
PLATELET # BLD AUTO: 221 K/UL
POTASSIUM SERPL-SCNC: 3.6 MMOL/L
PROT SERPL-MCNC: 7.2 G/DL
PROTEIN URINE: ABNORMAL
RBC # BLD: 4.34 M/UL
RBC # FLD: 13.9 %
SODIUM SERPL-SCNC: 140 MMOL/L
SPECIFIC GRAVITY URINE: 1.03
T4 FREE SERPL-MCNC: 1 NG/DL
TRIGL SERPL-MCNC: 684 MG/DL
TSH SERPL-ACNC: 0.65 UIU/ML
UROBILINOGEN URINE: NORMAL
VIT B12 SERPL-MCNC: 327 PG/ML
WBC # FLD AUTO: 4.43 K/UL

## 2021-03-12 PROCEDURE — 99396 PREV VISIT EST AGE 40-64: CPT | Mod: 25

## 2021-03-12 PROCEDURE — 82270 OCCULT BLOOD FECES: CPT

## 2021-03-12 PROCEDURE — 99072 ADDL SUPL MATRL&STAF TM PHE: CPT

## 2021-03-12 RX ORDER — SODIUM SULFATE, POTASSIUM SULFATE, MAGNESIUM SULFATE 17.5; 3.13; 1.6 G/ML; G/ML; G/ML
17.5-3.13-1.6 SOLUTION, CONCENTRATE ORAL
Qty: 1 | Refills: 0 | Status: COMPLETED | COMMUNITY
Start: 2019-02-27 | End: 2021-03-12

## 2021-03-12 RX ORDER — CHLORHEXIDINE GLUCONATE 4 %
LIQUID (ML) TOPICAL
Qty: 90 | Refills: 1 | Status: COMPLETED | COMMUNITY
Start: 2021-03-12 | End: 2021-03-12

## 2021-03-12 RX ORDER — AMLODIPINE BESYLATE 10 MG/1
10 TABLET ORAL
Qty: 90 | Refills: 1 | Status: ACTIVE | COMMUNITY
Start: 2019-11-08 | End: 1900-01-01

## 2021-03-12 NOTE — PAST MEDICAL HISTORY
[Perimenopausal] : perimenopausal [Menarche Age ____] : age at menarche was [unfilled] [Approximately ___] : the LMP was approximately [unfilled] [Total Preg ___] : G[unfilled] [Live Births ___] : P[unfilled]

## 2021-03-15 NOTE — DATA REVIEWED
[FreeTextEntry1] : Derm - never\par \par EKG - deferred, pt had EKG w/n the last few months at the hospital.

## 2021-03-15 NOTE — HISTORY OF PRESENT ILLNESS
[FreeTextEntry1] : Pt presented for PE.  Last PE was 1/2019. [de-identified] : Pt was hospitalized a few times in the last few months.  She had colon infection, and was treated with ABx.  She is due for a repeat colonoscopy, and also needs a DEXA scan.  She needs to f/u with GI.  \par \par Pt denied ETOH again.  She feels that her abdominal pain is related to diet.  She has tried to change her diet.   \par \par She had cholecystectomy in 3/2020.  She missed the flu shot in the past year.\par \par Pt was well and did not get sick throughout the COVID pandemic.

## 2021-03-15 NOTE — HEALTH RISK ASSESSMENT
[Very Good] : ~his/her~  mood as very good [Never (0 pts)] : Never (0 points) [No] : In the past 12 months have you used drugs other than those required for medical reasons? No [One fall no injury in past year] : Patient reported one fall in the past year without injury [0] : 2) Feeling down, depressed, or hopeless: Not at all (0) [None] : None [With Family] : lives with family [# of Members in Household ___] :  household currently consist of [unfilled] member(s) [College] : College [] :  [# Of Children ___] : has [unfilled] children [Feels Safe at Home] : Feels safe at home [Fully functional (bathing, dressing, toileting, transferring, walking, feeding)] : Fully functional (bathing, dressing, toileting, transferring, walking, feeding) [Fully functional (using the telephone, shopping, preparing meals, housekeeping, doing laundry, using] : Fully functional and needs no help or supervision to perform IADLs (using the telephone, shopping, preparing meals, housekeeping, doing laundry, using transportation, managing medications and managing finances) [Smoke Detector] : smoke detector [Carbon Monoxide Detector] : carbon monoxide detector [Seat Belt] :  uses seat belt [] : No [de-identified] : walking abut 4 days per week, [[id number]] : 0 [EyeExamDate] : 02/21 [Change in mental status noted] : No change in mental status noted [Reports changes in hearing] : Reports no changes in hearing [Reports changes in vision] : Reports no changes in vision [Reports changes in dental health] : Reports no changes in dental health [MammogramDate] : 09/20 [MammogramComments] : US breast - 9/2020 [PapSmearDate] : 09/20 [ColonoscopyDate] : Never [de-identified] : Marianela [de-identified] : dentist - 3/2020

## 2021-03-15 NOTE — PHYSICAL EXAM
[No Acute Distress] : no acute distress [Well Nourished] : well nourished [Well Developed] : well developed [Normal Sclera/Conjunctiva] : normal sclera/conjunctiva [PERRL] : pupils equal round and reactive to light [EOMI] : extraocular movements intact [Normal Outer Ear/Nose] : the outer ears and nose were normal in appearance [Normal Oropharynx] : the oropharynx was normal [Normal TMs] : both tympanic membranes were normal [Normal Nasal Mucosa] : the nasal mucosa was normal [No JVD] : no jugular venous distention [No Lymphadenopathy] : no lymphadenopathy [Supple] : supple [No Respiratory Distress] : no respiratory distress  [Clear to Auscultation] : lungs were clear to auscultation bilaterally [Normal Rate] : normal rate  [Regular Rhythm] : with a regular rhythm [Normal S1, S2] : normal S1 and S2 [No Carotid Bruits] : no carotid bruits [Pedal Pulses Present] : the pedal pulses are present [No Edema] : there was no peripheral edema [No Extremity Clubbing/Cyanosis] : no extremity clubbing/cyanosis [Normal Appearance] : normal in appearance [No Masses] : no palpable masses [No Nipple Discharge] : no nipple discharge [No Axillary Lymphadenopathy] : no axillary lymphadenopathy [Soft] : abdomen soft [Non Tender] : non-tender [Non-distended] : non-distended [Normal Bowel Sounds] : normal bowel sounds [Normal Sphincter Tone] : normal sphincter tone [No Mass] : no mass [Normal Supraclavicular Nodes] : no supraclavicular lymphadenopathy [Normal Axillary Nodes] : no axillary lymphadenopathy [Normal Posterior Cervical Nodes] : no posterior cervical lymphadenopathy [Normal Anterior Cervical Nodes] : no anterior cervical lymphadenopathy [No CVA Tenderness] : no CVA  tenderness [No Spinal Tenderness] : no spinal tenderness [No Joint Swelling] : no joint swelling [Grossly Normal Strength/Tone] : grossly normal strength/tone [No Rash] : no rash [Coordination Grossly Intact] : coordination grossly intact [No Focal Deficits] : no focal deficits [Normal Gait] : normal gait [Deep Tendon Reflexes (DTR)] : deep tendon reflexes were 2+ and symmetric [Speech Grossly Normal] : speech grossly normal [Normal Affect] : the affect was normal [Alert and Oriented x3] : oriented to person, place, and time [Normal Mood] : the mood was normal [Stool Occult Blood] : stool negative for occult blood [de-identified] : female in stated age,

## 2021-10-06 PROBLEM — I10 ESSENTIAL HYPERTENSION: Status: ACTIVE | Noted: 2017-10-19

## 2021-12-22 ENCOUNTER — APPOINTMENT (OUTPATIENT)
Dept: INTERNAL MEDICINE | Facility: CLINIC | Age: 51
End: 2021-12-22
Payer: COMMERCIAL

## 2021-12-22 DIAGNOSIS — U07.1 COVID-19: ICD-10-CM

## 2021-12-22 DIAGNOSIS — Z00.00 ENCOUNTER FOR GENERAL ADULT MEDICAL EXAMINATION W/OUT ABNORMAL FINDINGS: ICD-10-CM

## 2021-12-22 PROCEDURE — 99442: CPT

## 2021-12-22 NOTE — PHYSICAL EXAM
[No Acute Distress] : no acute distress [Speech Grossly Normal] : speech grossly normal [Normal Affect] : the affect was normal [Alert and Oriented x3] : oriented to person, place, and time [Normal Mood] : the mood was normal [de-identified] : Unable to examine patient due to telephonic encounter,

## 2021-12-22 NOTE — HISTORY OF PRESENT ILLNESS
[Home] : at home, [unfilled] , at the time of the visit. [Medical Office: ([facility])___] : at the medical office located in  [Verbal consent obtained from patient] : the patient, [unfilled] [FreeTextEntry1] : Patient initially presented for this telephonic encounter because she had COVID-19 infection last month, and she wanted to know when she can have booster vaccine as well as other questions regarding COVID-19.  However, the patient had repeat testing for COVID-19 yesterday, as she plans on joining her parents for a large house holiday for [place] celebration, she was found to have positive test again.\par \par Patient was not feeling well a couple of days after Thanksgiving, she and most of her family members were tested positive for COVID-19.  He also had mild illness and decline which recovered completely.  Her father works for a company that allows COVID-19 testing every week, she was also tested every week and was positive for 2 to 3 weeks, but she was tested negative last week.  She has pretty much recovered from the previous illness, except that her sense of smell and taste were not completely back to normal.\par \par Patient went for repeat COVID-19 testing yesterday, as she plans to join her parents for a [place] party.  She found out that she was tested positive again.  She denied any new symptoms at present, except for maybe some mild rhinorrhea. [de-identified] : Pt had COVID infection 2 days after Thanksgiving.  She had mild illness, and recovered completely, except she still has lost of sense of taste and smell.  She was tested negative for COVID last week, and she was  planning to go to see her parents for [place], and then so she had another COVID tested.

## 2022-02-07 ENCOUNTER — NON-APPOINTMENT (OUTPATIENT)
Age: 52
End: 2022-02-07

## 2022-02-08 ENCOUNTER — APPOINTMENT (OUTPATIENT)
Dept: GASTROENTEROLOGY | Facility: CLINIC | Age: 52
End: 2022-02-08
Payer: COMMERCIAL

## 2022-02-08 VITALS
HEIGHT: 66 IN | SYSTOLIC BLOOD PRESSURE: 110 MMHG | WEIGHT: 126 LBS | BODY MASS INDEX: 20.25 KG/M2 | DIASTOLIC BLOOD PRESSURE: 70 MMHG | TEMPERATURE: 97.3 F

## 2022-02-08 DIAGNOSIS — Z01.818 ENCOUNTER FOR OTHER PREPROCEDURAL EXAMINATION: ICD-10-CM

## 2022-02-08 DIAGNOSIS — Z87.19 PERSONAL HISTORY OF OTHER DISEASES OF THE DIGESTIVE SYSTEM: ICD-10-CM

## 2022-02-08 DIAGNOSIS — Z11.52 ENCOUNTER FOR SCREENING FOR COVID-19: ICD-10-CM

## 2022-02-08 DIAGNOSIS — Z86.79 PERSONAL HISTORY OF OTHER DISEASES OF THE CIRCULATORY SYSTEM: ICD-10-CM

## 2022-02-08 PROCEDURE — 99072 ADDL SUPL MATRL&STAF TM PHE: CPT

## 2022-02-08 PROCEDURE — 99203 OFFICE O/P NEW LOW 30 MIN: CPT | Mod: 25

## 2022-02-08 RX ORDER — SODIUM PICOSULFATE, MAGNESIUM OXIDE, AND ANHYDROUS CITRIC ACID 10; 3.5; 12 MG/160ML; G/160ML; G/160ML
10-3.5-12 MG-GM LIQUID ORAL
Qty: 1 | Refills: 0 | Status: COMPLETED | COMMUNITY
Start: 2022-02-08 | End: 2022-02-10

## 2022-02-08 NOTE — ASSESSMENT
[FreeTextEntry1] : Impression: Probable IBS. Probable GERD with functional component to dyspeptic symptoms. CT findings probably artifactual. Patient should have colonoscopy to rule out pathology given symptoms.\par \par Plan: Schedule colonoscopy with Clenpiq prep

## 2022-02-08 NOTE — HISTORY OF PRESENT ILLNESS
[Heartburn] : stable heartburn [Nausea] : stable nausea [Vomiting] : resolved vomiting [Diarrhea] : stable diarrhea [Constipation] : denies constipation [Yellow Skin Or Eyes (Jaundice)] : denies jaundice [Abdominal Pain] : stable abdominal pain [Abdominal Swelling] : denies abdominal swelling [Rectal Pain] : denies rectal pain [GERD] : gastroesophageal reflux disease [Peptic Ulcer Disease] : no peptic ulcer disease [Pancreatitis] : pancreatitis [Cholelithiasis] : cholelithiasis [Inflammatory Bowel Disease] : no inflammatory bowel disease [Irritable Bowel Syndrome] : irritable bowel syndrome [Alcohol Abuse] : alcohol abuse [Abdominal Surgery] : abdominal surgery [Cholecystectomy] : cholecystectomy [de-identified] : 51-year-old female with history of recurrent pancreatitis secondary to alcohol abuse, chronic dyspeptic symptoms with recurrent abdominal pain nausea and vomiting went to the emergency room 3 days ago with right upper quadrant pain. Labs unremarkable. CT unremarkable except for questionable mild thickened segment of right colon. Patient has no family history of colon cancer or polyps both scheduled for colonoscopy in 2019 to evaluate symptoms. She never had the exam because she became ill prior. She had COVID late last year and has been experiencing some intermittent diarrhea since that time. She denies melena or hematochezia. Denies weight loss. Not taking any GI medications currently.\par \par Had EGD/EUS a couple of years ago to evaluate the pancreas. There is a small sidebranch IPMN is some calcifications in the tail but no criteria for chronic pancreatitis

## 2022-02-14 LAB — SARS-COV-2 N GENE NPH QL NAA+PROBE: DETECTED

## 2022-02-16 ENCOUNTER — APPOINTMENT (OUTPATIENT)
Dept: GASTROENTEROLOGY | Facility: AMBULATORY MEDICAL SERVICES | Age: 52
End: 2022-02-16

## 2022-03-18 ENCOUNTER — APPOINTMENT (OUTPATIENT)
Dept: GASTROENTEROLOGY | Facility: AMBULATORY MEDICAL SERVICES | Age: 52
End: 2022-03-18
Payer: SELF-PAY

## 2022-03-18 PROCEDURE — 45378 DIAGNOSTIC COLONOSCOPY: CPT | Mod: 33

## 2022-05-12 ENCOUNTER — NON-APPOINTMENT (OUTPATIENT)
Age: 52
End: 2022-05-12

## 2022-09-21 ENCOUNTER — NON-APPOINTMENT (OUTPATIENT)
Age: 52
End: 2022-09-21

## 2022-09-29 ENCOUNTER — APPOINTMENT (OUTPATIENT)
Dept: GASTROENTEROLOGY | Facility: CLINIC | Age: 52
End: 2022-09-29

## 2022-09-29 VITALS
HEART RATE: 89 BPM | TEMPERATURE: 96.9 F | DIASTOLIC BLOOD PRESSURE: 60 MMHG | SYSTOLIC BLOOD PRESSURE: 110 MMHG | OXYGEN SATURATION: 98 % | WEIGHT: 117.5 LBS | HEIGHT: 66 IN | BODY MASS INDEX: 18.88 KG/M2

## 2022-09-29 DIAGNOSIS — Z78.9 OTHER SPECIFIED HEALTH STATUS: ICD-10-CM

## 2022-09-29 DIAGNOSIS — R93.3 ABNORMAL FINDINGS ON DIAGNOSTIC IMAGING OF OTHER PARTS OF DIGESTIVE TRACT: ICD-10-CM

## 2022-09-29 DIAGNOSIS — A09 INFECTIOUS GASTROENTERITIS AND COLITIS, UNSPECIFIED: ICD-10-CM

## 2022-09-29 DIAGNOSIS — R19.8 OTHER SPECIFIED SYMPTOMS AND SIGNS INVOLVING THE DIGESTIVE SYSTEM AND ABDOMEN: ICD-10-CM

## 2022-09-29 PROCEDURE — 99214 OFFICE O/P EST MOD 30 MIN: CPT

## 2022-09-29 NOTE — HISTORY OF PRESENT ILLNESS
[FreeTextEntry1] : 03/18/2022: Normal, 10-year follow-up [de-identified] : Multiple.  Most recently 1 week ago.  Status post cholecystectomy, minimal scattered small pancreatic calcifications.  Nonobstructing 2 mm right kidney stone, otherwise unremarkable

## 2022-09-29 NOTE — ASSESSMENT
[FreeTextEntry1] : Impression: Acute infectious gastroenteritis resolved.  No evidence of recurrent pancreatitis.  Recent colonoscopy unremarkable.  Currently asymptomatic.\par \par Plan: No further work-up or intervention required.  Follow-up as needed

## 2022-11-02 NOTE — ED PROVIDER NOTE - PROGRESS NOTE DETAILS
patient calm, cooperative, evaluated by psych, cleared for dc, denying si/hi at this time. WIll obs until sober given bal 240. Approx 3pm.
Normal for race

## 2023-01-21 ENCOUNTER — EMERGENCY (EMERGENCY)
Facility: HOSPITAL | Age: 53
LOS: 1 days | Discharge: HOME | End: 2023-01-21
Attending: STUDENT IN AN ORGANIZED HEALTH CARE EDUCATION/TRAINING PROGRAM | Admitting: STUDENT IN AN ORGANIZED HEALTH CARE EDUCATION/TRAINING PROGRAM
Payer: COMMERCIAL

## 2023-01-21 VITALS
HEIGHT: 66 IN | SYSTOLIC BLOOD PRESSURE: 145 MMHG | OXYGEN SATURATION: 100 % | DIASTOLIC BLOOD PRESSURE: 105 MMHG | TEMPERATURE: 97 F | WEIGHT: 115.08 LBS | RESPIRATION RATE: 20 BRPM | HEART RATE: 120 BPM

## 2023-01-21 VITALS
HEART RATE: 98 BPM | RESPIRATION RATE: 18 BRPM | TEMPERATURE: 98 F | SYSTOLIC BLOOD PRESSURE: 133 MMHG | DIASTOLIC BLOOD PRESSURE: 77 MMHG | OXYGEN SATURATION: 98 %

## 2023-01-21 DIAGNOSIS — Z98.891 HISTORY OF UTERINE SCAR FROM PREVIOUS SURGERY: Chronic | ICD-10-CM

## 2023-01-21 DIAGNOSIS — Z90.49 ACQUIRED ABSENCE OF OTHER SPECIFIED PARTS OF DIGESTIVE TRACT: Chronic | ICD-10-CM

## 2023-01-21 LAB
ALBUMIN SERPL ELPH-MCNC: 4.1 G/DL — SIGNIFICANT CHANGE UP (ref 3.3–5)
ALP SERPL-CCNC: 70 U/L — SIGNIFICANT CHANGE UP (ref 30–120)
ALT FLD-CCNC: 33 U/L DA — SIGNIFICANT CHANGE UP (ref 10–60)
ANION GAP SERPL CALC-SCNC: 10 MMOL/L — SIGNIFICANT CHANGE UP (ref 5–17)
APTT BLD: 24.8 SEC — LOW (ref 27.5–35.5)
AST SERPL-CCNC: 36 U/L — SIGNIFICANT CHANGE UP (ref 10–40)
BASOPHILS # BLD AUTO: 0.03 K/UL — SIGNIFICANT CHANGE UP (ref 0–0.2)
BASOPHILS # BLD AUTO: 0.04 K/UL — SIGNIFICANT CHANGE UP (ref 0–0.2)
BASOPHILS NFR BLD AUTO: 0.3 % — SIGNIFICANT CHANGE UP (ref 0–2)
BASOPHILS NFR BLD AUTO: 0.3 % — SIGNIFICANT CHANGE UP (ref 0–2)
BILIRUB SERPL-MCNC: 0.8 MG/DL — SIGNIFICANT CHANGE UP (ref 0.2–1.2)
BLD GP AB SCN SERPL QL: SIGNIFICANT CHANGE UP
BUN SERPL-MCNC: 20 MG/DL — SIGNIFICANT CHANGE UP (ref 7–23)
CALCIUM SERPL-MCNC: 9.2 MG/DL — SIGNIFICANT CHANGE UP (ref 8.4–10.5)
CHLORIDE SERPL-SCNC: 102 MMOL/L — SIGNIFICANT CHANGE UP (ref 96–108)
CO2 SERPL-SCNC: 24 MMOL/L — SIGNIFICANT CHANGE UP (ref 22–31)
CREAT SERPL-MCNC: 1.05 MG/DL — SIGNIFICANT CHANGE UP (ref 0.5–1.3)
EGFR: 65 ML/MIN/1.73M2 — SIGNIFICANT CHANGE UP
EOSINOPHIL # BLD AUTO: 0 K/UL — SIGNIFICANT CHANGE UP (ref 0–0.5)
EOSINOPHIL # BLD AUTO: 0.01 K/UL — SIGNIFICANT CHANGE UP (ref 0–0.5)
EOSINOPHIL NFR BLD AUTO: 0 % — SIGNIFICANT CHANGE UP (ref 0–6)
EOSINOPHIL NFR BLD AUTO: 0.1 % — SIGNIFICANT CHANGE UP (ref 0–6)
GLUCOSE SERPL-MCNC: 118 MG/DL — HIGH (ref 70–99)
HCG SERPL-ACNC: 5 MIU/ML — SIGNIFICANT CHANGE UP
HCG UR QL: NEGATIVE — SIGNIFICANT CHANGE UP
HCT VFR BLD CALC: 31.8 % — LOW (ref 34.5–45)
HCT VFR BLD CALC: 38.1 % — SIGNIFICANT CHANGE UP (ref 34.5–45)
HGB BLD-MCNC: 10.3 G/DL — LOW (ref 11.5–15.5)
HGB BLD-MCNC: 12.2 G/DL — SIGNIFICANT CHANGE UP (ref 11.5–15.5)
IMM GRANULOCYTES NFR BLD AUTO: 0.3 % — SIGNIFICANT CHANGE UP (ref 0–0.9)
IMM GRANULOCYTES NFR BLD AUTO: 0.4 % — SIGNIFICANT CHANGE UP (ref 0–0.9)
INR BLD: 1.03 RATIO — SIGNIFICANT CHANGE UP (ref 0.88–1.16)
LIDOCAIN IGE QN: 69 U/L — LOW (ref 73–393)
LYMPHOCYTES # BLD AUTO: 1.29 K/UL — SIGNIFICANT CHANGE UP (ref 1–3.3)
LYMPHOCYTES # BLD AUTO: 1.6 K/UL — SIGNIFICANT CHANGE UP (ref 1–3.3)
LYMPHOCYTES # BLD AUTO: 11.2 % — LOW (ref 13–44)
LYMPHOCYTES # BLD AUTO: 15.3 % — SIGNIFICANT CHANGE UP (ref 13–44)
MCHC RBC-ENTMCNC: 26.1 PG — LOW (ref 27–34)
MCHC RBC-ENTMCNC: 26.5 PG — LOW (ref 27–34)
MCHC RBC-ENTMCNC: 32 GM/DL — SIGNIFICANT CHANGE UP (ref 32–36)
MCHC RBC-ENTMCNC: 32.4 GM/DL — SIGNIFICANT CHANGE UP (ref 32–36)
MCV RBC AUTO: 81.6 FL — SIGNIFICANT CHANGE UP (ref 80–100)
MCV RBC AUTO: 81.7 FL — SIGNIFICANT CHANGE UP (ref 80–100)
MONOCYTES # BLD AUTO: 0.7 K/UL — SIGNIFICANT CHANGE UP (ref 0–0.9)
MONOCYTES # BLD AUTO: 0.7 K/UL — SIGNIFICANT CHANGE UP (ref 0–0.9)
MONOCYTES NFR BLD AUTO: 6.1 % — SIGNIFICANT CHANGE UP (ref 2–14)
MONOCYTES NFR BLD AUTO: 6.7 % — SIGNIFICANT CHANGE UP (ref 2–14)
NEUTROPHILS # BLD AUTO: 8.07 K/UL — HIGH (ref 1.8–7.4)
NEUTROPHILS # BLD AUTO: 9.44 K/UL — HIGH (ref 1.8–7.4)
NEUTROPHILS NFR BLD AUTO: 77.2 % — HIGH (ref 43–77)
NEUTROPHILS NFR BLD AUTO: 82.1 % — HIGH (ref 43–77)
NRBC # BLD: 0 /100 WBCS — SIGNIFICANT CHANGE UP (ref 0–0)
NRBC # BLD: 0 /100 WBCS — SIGNIFICANT CHANGE UP (ref 0–0)
PLATELET # BLD AUTO: 203 K/UL — SIGNIFICANT CHANGE UP (ref 150–400)
PLATELET # BLD AUTO: 272 K/UL — SIGNIFICANT CHANGE UP (ref 150–400)
POTASSIUM SERPL-MCNC: 3.3 MMOL/L — LOW (ref 3.5–5.3)
POTASSIUM SERPL-SCNC: 3.3 MMOL/L — LOW (ref 3.5–5.3)
PROT SERPL-MCNC: 8.3 G/DL — SIGNIFICANT CHANGE UP (ref 6–8.3)
PROTHROM AB SERPL-ACNC: 11.9 SEC — SIGNIFICANT CHANGE UP (ref 10.5–13.4)
RBC # BLD: 3.89 M/UL — SIGNIFICANT CHANGE UP (ref 3.8–5.2)
RBC # BLD: 4.67 M/UL — SIGNIFICANT CHANGE UP (ref 3.8–5.2)
RBC # FLD: 15.7 % — HIGH (ref 10.3–14.5)
RBC # FLD: 15.8 % — HIGH (ref 10.3–14.5)
SARS-COV-2 RNA SPEC QL NAA+PROBE: SIGNIFICANT CHANGE UP
SODIUM SERPL-SCNC: 136 MMOL/L — SIGNIFICANT CHANGE UP (ref 135–145)
WBC # BLD: 10.45 K/UL — SIGNIFICANT CHANGE UP (ref 3.8–10.5)
WBC # BLD: 11.5 K/UL — HIGH (ref 3.8–10.5)
WBC # FLD AUTO: 10.45 K/UL — SIGNIFICANT CHANGE UP (ref 3.8–10.5)
WBC # FLD AUTO: 11.5 K/UL — HIGH (ref 3.8–10.5)

## 2023-01-21 PROCEDURE — 85610 PROTHROMBIN TIME: CPT

## 2023-01-21 PROCEDURE — 99285 EMERGENCY DEPT VISIT HI MDM: CPT

## 2023-01-21 PROCEDURE — 83690 ASSAY OF LIPASE: CPT

## 2023-01-21 PROCEDURE — 86850 RBC ANTIBODY SCREEN: CPT

## 2023-01-21 PROCEDURE — 87635 SARS-COV-2 COVID-19 AMP PRB: CPT

## 2023-01-21 PROCEDURE — 74177 CT ABD & PELVIS W/CONTRAST: CPT | Mod: 26,MA

## 2023-01-21 PROCEDURE — 99284 EMERGENCY DEPT VISIT MOD MDM: CPT | Mod: 25

## 2023-01-21 PROCEDURE — 36415 COLL VENOUS BLD VENIPUNCTURE: CPT

## 2023-01-21 PROCEDURE — 86901 BLOOD TYPING SEROLOGIC RH(D): CPT

## 2023-01-21 PROCEDURE — 85730 THROMBOPLASTIN TIME PARTIAL: CPT

## 2023-01-21 PROCEDURE — 86900 BLOOD TYPING SEROLOGIC ABO: CPT

## 2023-01-21 PROCEDURE — 96361 HYDRATE IV INFUSION ADD-ON: CPT

## 2023-01-21 PROCEDURE — 80053 COMPREHEN METABOLIC PANEL: CPT

## 2023-01-21 PROCEDURE — 76830 TRANSVAGINAL US NON-OB: CPT

## 2023-01-21 PROCEDURE — 96374 THER/PROPH/DIAG INJ IV PUSH: CPT | Mod: XU

## 2023-01-21 PROCEDURE — 76856 US EXAM PELVIC COMPLETE: CPT | Mod: 26

## 2023-01-21 PROCEDURE — 76830 TRANSVAGINAL US NON-OB: CPT | Mod: 26

## 2023-01-21 PROCEDURE — 76856 US EXAM PELVIC COMPLETE: CPT

## 2023-01-21 PROCEDURE — 96375 TX/PRO/DX INJ NEW DRUG ADDON: CPT

## 2023-01-21 PROCEDURE — 84702 CHORIONIC GONADOTROPIN TEST: CPT

## 2023-01-21 PROCEDURE — 74177 CT ABD & PELVIS W/CONTRAST: CPT | Mod: MA

## 2023-01-21 PROCEDURE — 85025 COMPLETE CBC W/AUTO DIFF WBC: CPT

## 2023-01-21 PROCEDURE — 81025 URINE PREGNANCY TEST: CPT

## 2023-01-21 RX ORDER — POTASSIUM CHLORIDE 20 MEQ
40 PACKET (EA) ORAL ONCE
Refills: 0 | Status: COMPLETED | OUTPATIENT
Start: 2023-01-21 | End: 2023-01-21

## 2023-01-21 RX ORDER — SODIUM CHLORIDE 9 MG/ML
1000 INJECTION INTRAMUSCULAR; INTRAVENOUS; SUBCUTANEOUS ONCE
Refills: 0 | Status: COMPLETED | OUTPATIENT
Start: 2023-01-21 | End: 2023-01-21

## 2023-01-21 RX ORDER — ONDANSETRON 8 MG/1
4 TABLET, FILM COATED ORAL ONCE
Refills: 0 | Status: COMPLETED | OUTPATIENT
Start: 2023-01-21 | End: 2023-01-21

## 2023-01-21 RX ORDER — ACETAMINOPHEN 500 MG
1000 TABLET ORAL ONCE
Refills: 0 | Status: COMPLETED | OUTPATIENT
Start: 2023-01-21 | End: 2023-01-21

## 2023-01-21 RX ORDER — TRANEXAMIC ACID 100 MG/ML
2 INJECTION, SOLUTION INTRAVENOUS
Qty: 30 | Refills: 0
Start: 2023-01-21 | End: 2023-01-25

## 2023-01-21 RX ORDER — AMLODIPINE BESYLATE 2.5 MG/1
0 TABLET ORAL
Qty: 0 | Refills: 0 | DISCHARGE

## 2023-01-21 RX ORDER — KETOROLAC TROMETHAMINE 30 MG/ML
30 SYRINGE (ML) INJECTION ONCE
Refills: 0 | Status: DISCONTINUED | OUTPATIENT
Start: 2023-01-21 | End: 2023-01-21

## 2023-01-21 RX ADMIN — Medication 1000 MILLIGRAM(S): at 05:16

## 2023-01-21 RX ADMIN — SODIUM CHLORIDE 1000 MILLILITER(S): 9 INJECTION INTRAMUSCULAR; INTRAVENOUS; SUBCUTANEOUS at 04:19

## 2023-01-21 RX ADMIN — Medication 30 MILLIGRAM(S): at 03:03

## 2023-01-21 RX ADMIN — SODIUM CHLORIDE 1000 MILLILITER(S): 9 INJECTION INTRAMUSCULAR; INTRAVENOUS; SUBCUTANEOUS at 02:45

## 2023-01-21 RX ADMIN — ONDANSETRON 4 MILLIGRAM(S): 8 TABLET, FILM COATED ORAL at 02:46

## 2023-01-21 RX ADMIN — Medication 1000 MILLIGRAM(S): at 05:01

## 2023-01-21 RX ADMIN — Medication 30 MILLIGRAM(S): at 02:47

## 2023-01-21 RX ADMIN — Medication 40 MILLIEQUIVALENT(S): at 03:07

## 2023-01-21 RX ADMIN — Medication 400 MILLIGRAM(S): at 04:46

## 2023-01-21 NOTE — ED ADULT NURSE NOTE - CHPI ED NUR SYMPTOMS POS
c/o heavy vaginal bleeding x 1.5 hours. "passing clots". Also c/o abd pain radiating to back. last "normal" menstrual period 1/1/23./CRAMPS/PELVIC PAIN

## 2023-01-21 NOTE — ED PROVIDER NOTE - PROGRESS NOTE DETAILS
Results of work up d/w patient and copies of all reports given.  Discussed recommendations from Dr. Taylor.  HR 88, BP stable.  Patient will take Lysteda and call her Gyn today.  Understands to return to the ER for any changes.

## 2023-01-21 NOTE — ED PROVIDER NOTE - PROVIDER TOKENS
PROVIDER:[TOKEN:[[id number]]],PROVIDER:[TOKEN:[[id number]]],PROVIDER:[TOKEN:[[id number]]]

## 2023-01-21 NOTE — ED PROVIDER NOTE - NSICDXPASTSURGICALHX_GEN_ALL_CORE_FT
PAST SURGICAL HISTORY:  H/O  section     History of appendectomy     History of cholecystectomy

## 2023-01-21 NOTE — ED PROVIDER NOTE - CLINICAL SUMMARY MEDICAL DECISION MAKING FREE TEXT BOX
52 year old female p/w sudden onset of heavy vaginal bleeding x 1.5 hours.  Patient reports passing large clots and feeling dizzy.  Check labs, H/H, hydrate, obtain pelvic ultrasound, consult Gyn as needed.

## 2023-01-21 NOTE — ED PROVIDER NOTE - OBJECTIVE STATEMENT
52 year old female with a history of HTN presents with vaginal bleeding.  Patient states she woke up 1.5 hours ago with heavy bleeding and noticed that she started to pass large clots.  She used several pads and eventually had to use towel, then started to feel dizzy.  While driving to the ED, she developed lower abdominal cramping. Patient states LMP 1/1/23.  She has no history of fibroids or DUB.  She still menstruates regularly.  She saw her Gyn within the last 6 months and had a normal pap smear.  PMD Cande Escobar, Gyn Mai Gamble

## 2023-01-21 NOTE — ED PROVIDER NOTE - DIFFERENTIAL DIAGNOSIS
Pregnancy, thrombocytopenia, foreign body, fibroids, abnormal mass/polyps, endometriosis Differential Diagnosis

## 2023-01-21 NOTE — ED PROVIDER NOTE - CARE PROVIDERS DIRECT ADDRESSES
[email or web address],DirectAddress_Unknown,DirectAddress_Unknown

## 2023-01-21 NOTE — ED PROVIDER NOTE - PATIENT PORTAL LINK FT
Writer reviewed discharge instructions with patient. Patient verbalized understanding and aware of need to make follow up appointment with Dr. Henny Smith. Denies questions. Copy of discharge instructions given to patient. You can access the FollowMyHealth Patient Portal offered by [facility] by registering at the following website: [email or web address]. By joining [facility]’s FollowMyHealth portal, you will also be able to view your health information using other applications (apps) compatible with our system.

## 2023-01-21 NOTE — ED PROVIDER NOTE - NOTES
Dr. Taylor aware of results of blood work and imaging.  Recommends giving patient Lysteda 650 mg, 2 tabs TID. Give 2 days worth and Dr. Taylor can see patient in his office on Monday.  Patient can also follow up with her Gyn

## 2023-01-21 NOTE — ED ADULT TRIAGE NOTE - CHIEF COMPLAINT QUOTE
c/o heavy vaginal bleeding x 1.5 hours. "passing clots". Also c/o abd pain radiating to back. last "normal" menstrual period 1/1/23.

## 2023-01-21 NOTE — ED PROVIDER NOTE - CARE PROVIDER_API CALL
Cande Escobar (MD)  Internal Medicine  [address], [address]  [place] [address]  Phone: [phone]  Fax: [phone]  Follow Up Time:     SHRADDHA QUIROZ  Obstetrics and Gynecology  [address] [place] [address]  Phone: ()-  Fax: ()-  Follow Up Time:     Harshad Taylor)  Obstetrics and Gynecology  [address] [place] [address]  Phone: [phone]  Fax: [phone]  Follow Up Time:

## 2023-01-21 NOTE — ED ADULT NURSE NOTE - PRO INTERPRETER NEED 2
Patient is due for their annual anticoagulation referral order. If you approve, please sign order and close encounter. Thank you. Orders must be signed by MD or NP.     English

## 2023-01-21 NOTE — ED PROVIDER NOTE - NSFOLLOWUPINSTRUCTIONS_ED_ALL_ED_FT
Please take the medication as prescribed and follow up with Gyn.  Return to the ER for persistent bleeding, abdominal pain, dizziness, fainting, fever, vomiting, or any other concerns.       Menorrhagia      Menorrhagia is when your monthly periods are heavy or last longer than normal. If you have this condition, bleeding and cramping may make it hard for you to do your daily activities.      What are the causes?    Common causes of this condition include:  •Growths in the womb (uterus). These are polyps or fibroids. These growths are not cancer.      •Problems with two hormones called estrogen and progesterone.      •One of the ovaries not releasing an egg during one or more months.      •A problem with the thyroid gland.      •Having a device for birth control (IUD).      •Side effects of some medicines, such as NSAIDs or blood thinners.      •A disorder that stops the blood from clotting normally.        What increases the risk?    You are more likely to have this condition if you have cancer of the womb.      What are the signs or symptoms?    •Having to change your pad or tampon every 1–2 hours because it is soaked.      •Needing to use pads and tampons at the same time because of heavy bleeding.      • Needing to wake up to change your pads or tampons during the night.       •Passing blood clots larger than 1 inch (2.5 cm) in size.       •Having bleeding that lasts for more than 7 days.       •Having symptoms of low iron levels (anemia), such as feeling tired or having shortness of breath.        How is this treated?     You may not need to be treated for this condition. But if you need treatment, you may be given medicines:  •To reduce bleeding during your period. These include birth control medicines.      •To make your blood thick. This slows bleeding.      •To reduce swelling. Medicines that do this include ibuprofen.      •That have a hormone called progestin.      •That make the ovaries stop working for a short time.      •To treat low iron levels. You will be given iron pills if you have this condition.      If medicines do not work, surgery may be done. Surgery may be done to:  •Remove a part of the lining of the womb. This lining is called the endometrium. This reduces bleeding during a period.      •Remove growths in the womb. These may be polyps or fibroids.      •Remove the entire lining of the womb.      •Remove the womb entirely. This procedure is called a hysterectomy.        Follow these instructions at home:    Medicines     •Take over-the-counter and prescription medicines only as told by your doctor. This includes iron pills.      • Do not change or switch medicines without asking your doctor.      • Do not take aspirin or medicines that contain aspirin 1 week before or during your period. Aspirin may make bleeding worse.      Managing constipation     Iron pills may cause trouble pooping (constipation). To prevent or treat problems when pooping, you may need to:  •Drink enough fluid to keep your pee (urine) pale yellow.      •Take over-the-counter or prescription medicines.      •Eat foods that are high in fiber. These include beans, whole grains, and fresh fruits and vegetables.      •Limit foods that are high in fat and sugar. These include fried or sweet foods.      General instructions    •If you need to change your pad or tampon more than once every 2 hours, limit your activity until the bleeding stops.    •Eat healthy meals and foods that are high in iron. Foods that have a lot of iron include:  •Leafy green vegetables.      •Meat.      •Liver.      •Eggs.      •Whole-grain breads and cereals.        •Do not try to lose weight until your heavy bleeding has stopped and you have normal amounts of iron in your blood. If you need to lose weight, work with your doctor.      •Keep all follow-up visits.        Contact a doctor if:    •You soak through a pad or tampon every 1 or 2 hours, and this happens every time you have a period.      •You need to use pads and tampons at the same time because you are bleeding so much.    •You are taking medicine, and:  •You feel like you may vomit.      •You vomit.      •You have watery poop (diarrhea).        •You have other problems that may be related to the medicine you are taking.        Get help right away if:    •You soak through more than a pad or tampon in 1 hour.      •You pass clots bigger than 1 inch (2.5 cm) wide.      •You feel short of breath.      •You feel like your heart is beating too fast.      •You feel dizzy or you faint.      •You feel very weak or tired.        Summary    •Menorrhagia is when your menstrual periods are heavy or last longer than normal.      •You may not need to be treated for this condition. If you need treatment, you may be given medicines or have surgery.      •Take over-the-counter and prescription medicines only as told by your doctor. This includes iron pills.      •Get help right away if you soak through more than a pad or tampon in 1 hour or you pass large clots. Also, get help right away if you feel dizzy, short of breath, or very weak or tired.      This information is not intended to replace advice given to you by your health care provider. Make sure you discuss any questions you have with your health care provider.

## 2023-04-03 ENCOUNTER — NON-APPOINTMENT (OUTPATIENT)
Age: 53
End: 2023-04-03

## 2023-06-01 NOTE — HISTORY OF PRESENT ILLNESS
[FreeTextEntry1] : Patient comes to the office complaining of abdominal bloating lower abdominal cramps with change in bowel movements. She states that she quickly experiences lower abdominal cramps associated with the urge to defecate and diarrhea. There is no gross blood there is no nausea no vomiting.\par \par Patient has a history of chronic pancreatitis secondary to alcohol usage. She has been stable with this and has not been drinking alcohol.\par \par She has no other complaints
see mdm for attending note

## 2023-06-12 ENCOUNTER — APPOINTMENT (OUTPATIENT)
Dept: GASTROENTEROLOGY | Facility: CLINIC | Age: 53
End: 2023-06-12
Payer: COMMERCIAL

## 2023-06-12 VITALS
BODY MASS INDEX: 18 KG/M2 | TEMPERATURE: 97.3 F | SYSTOLIC BLOOD PRESSURE: 100 MMHG | WEIGHT: 112 LBS | DIASTOLIC BLOOD PRESSURE: 70 MMHG | HEIGHT: 66 IN

## 2023-06-12 DIAGNOSIS — E78.1 PURE HYPERGLYCERIDEMIA: ICD-10-CM

## 2023-06-12 DIAGNOSIS — Z09 ENCOUNTER FOR FOLLOW-UP EXAMINATION AFTER COMPLETED TREATMENT FOR CONDITIONS OTHER THAN MALIGNANT NEOPLASM: ICD-10-CM

## 2023-06-12 PROCEDURE — 99214 OFFICE O/P EST MOD 30 MIN: CPT

## 2023-06-12 NOTE — ASSESSMENT
[FreeTextEntry1] : Impression: Acute on chronic pancreatitis.  Did unclear what the trigger may have been.  Patient has had elevated triglycerides in the past.  Chronic pancreatitis secondary to history of alcohol abuse in the past.\par \par Plan: Recommend low-fat diet.  No other intervention at this point.

## 2023-06-12 NOTE — HISTORY OF PRESENT ILLNESS
[FreeTextEntry1] : Patient was hospitalized at [facility] in late March with an episode of acute pancreatitis.  Had 2 days of severe epigastric pain not relieved by clear liquid diet, lipase elevated on admission.  CT shows acute interstitial pancreatitis with underlying chronic pancreatitis as previously known and worked up.  Repeat IgG4 was normal.  Patient denies alcohol use.  No obvious dietary triggers.  Has had elevated triglycerides in the past, does not necessarily follow a low-fat diet.

## 2023-11-29 NOTE — ED BEHAVIORAL HEALTH ASSESSMENT NOTE - ACTIVATING EVENTS/STRESSORS
Plan: Pt reports burning and irritation with tretinoin. Will start Azeliac acid BID. Detail Level: Zone Initiate Treatment: Azeliac acid Discontinue Regimen: Tretinoin, clindamycin Render In Strict Bullet Format?: No Other

## 2024-01-02 NOTE — ED ADULT NURSE NOTE - NSFALLRSKASSESSDT_ED_ALL_ED
Chief Complaint:   Thoracic aortic aneurysm     History of Present Illness     Leonides Izaguirre is a 77 y.o. male presenting with for evaluation of asymptomatic ascending aortic aneurysm.  The patient is tolerating their anti-hypertensive medications and is compliant.  The patient has been well and is not having any chest pain, back pain, or dyspnea on exertion.  They do not have  bicuspid aortic valve, connective tissue disease, or family history of aortic aneurysm/dissection. No HX CAD, MI, CHF, CVA, AF.    Review of Systems  All pertinent systems have been reviewed and are negative except for what is stated in the history of present illness.    All other systems have been reviewed and are negative and noncontributory to this patient's current ailments.  .       Previous History     Past Medical History:  He has a past medical history of Aneurysm of ascending aorta without rupture (CMS/HCC) (01/02/2024), Atrial septal aneurysm (01/02/2024), Encounter for screening for cardiovascular disorders (06/11/2021), Essential (primary) hypertension (12/01/2022), Hyperlipidemia, unspecified (12/01/2022), and Nicotine dependence, unspecified, uncomplicated (05/23/2022).    Past Surgical History:  He has a past surgical history that includes Other surgical history (12/12/2013).      Social History:  He reports that he has been smoking cigarettes. He has never used smokeless tobacco. No history on file for alcohol use and drug use.    Family History:  Family History   Problem Relation Name Age of Onset    Esophageal cancer Mother      Colon cancer Mother      Esophageal cancer Maternal Grandmother          Allergies:  Amlodipine    Outpatient Medications:  Current Outpatient Medications   Medication Instructions    allopurinol (ZYLOPRIM) 100 mg, oral, Daily    atorvastatin (LIPITOR) 40 mg, oral, Daily    fluocinolone (DermOtic) 0.01 % ear drops 5 drops, Each Ear, 2 times daily    olmesartan (BENICAR) 40 mg, oral, Daily     "tadalafil (CIALIS) 5 mg, oral, Daily       Physical Examination   Vitals:  Visit Vitals  /79 (BP Location: Right arm)   Pulse 99   Temp 36.9 °C (98.4 °F)   Ht 1.803 m (5' 11\")   Wt 97.4 kg (214 lb 12.8 oz)   BMI 29.96 kg/m²   Smoking Status Every Day   BSA 2.21 m²    Physical Exam  Vitals reviewed.   Constitutional:       General: He is not in acute distress.     Appearance: Normal appearance.   HENT:      Head: Normocephalic and atraumatic.      Nose: Nose normal.   Eyes:      Conjunctiva/sclera: Conjunctivae normal.   Cardiovascular:      Rate and Rhythm: Normal rate and regular rhythm.      Pulses: Normal pulses.      Heart sounds: Murmur heard.      Systolic murmur is present with a grade of 1/6.   Pulmonary:      Effort: Pulmonary effort is normal. No respiratory distress.      Breath sounds: Normal breath sounds. No wheezing, rhonchi or rales.   Abdominal:      General: Bowel sounds are normal. There is no distension.      Palpations: Abdomen is soft.      Tenderness: There is no abdominal tenderness.   Musculoskeletal:         General: No swelling.      Right lower leg: No edema.      Left lower leg: No edema.   Skin:     General: Skin is warm and dry.      Capillary Refill: Capillary refill takes less than 2 seconds.   Neurological:      General: No focal deficit present.      Mental Status: He is alert.   Psychiatric:         Mood and Affect: Mood normal.              Labs/Imaging/Cardiac Studies     Last Labs:  CBC -  Lab Results   Component Value Date    WBC 9.7 11/16/2021    HGB 15.5 11/16/2021    HCT 50.0 11/16/2021     11/16/2021     11/16/2021       CMP -  Lab Results   Component Value Date    CALCIUM 9.5 11/21/2023    PROT 7.1 11/21/2023    ALBUMIN 4.5 11/21/2023    AST 21 11/21/2023    ALT 24 11/21/2023    ALKPHOS 75 11/21/2023    BILITOT 0.7 11/21/2023       LIPID PANEL -   Lab Results   Component Value Date    CHOL 142 11/21/2023    HDL 46.7 11/21/2023    CHHDL 3.0 11/21/2023 " "   VLDL 41 (H) 11/21/2023    TRIG 205 (H) 11/21/2023    NHDL 95 11/21/2023       RENAL FUNCTION PANEL -   Lab Results   Component Value Date    K 4.6 11/21/2023       No results found for: \"BNP\", \"HGBA1C\"    ECG:    Echo:  Transthoracic Echo (TTE) Complete    Result Date: 12/22/2023   [facility], [address] [place] [address]               Tel [phone] and Fax [phone] TRANSTHORACIC ECHOCARDIOGRAM REPORT  Patient Name:     MANNY Vargas Physician:  [id number] Torrey Martin MD Study Date:       12/22/2023          Ordering Provider:  [id number] ABBIE COOPER MRN/PID:          [id number]            Fellow: Accession#:       [id number]        Nurse: Date of           1946 / 77      Sonographer:        Ayesha Bergman RDCS, Birth/Age:        years                                   RVT Gender:           M                   Additional Staff: Height:           180.34 cm           Admit Date: Weight:           97.07 kg            Admission Status:   Outpatient BSA:              2.17 m2             Encounter#:         [id number]                                       Department          [facility]                                       Location: Blood Pressure: 132 /70 mmHg Study Type:    TRANSTHORACIC ECHO (TTE) COMPLETE Diagnosis/ICD: Nonrheumatic aortic valve disorder, unspecified-I35.9; Other                nonrheumatic aortic valve disorders-I35.8 Indication:    Aortic valve calcification. Aortic valve sclerosis. CPT Code:      Echo Complete w Full [id number] Patient History: Pertinent History: HTN. HLD. Study Detail: The following Echo studies were performed: 2D, M-Mode, Doppler and               color flow.  PHYSICIAN INTERPRETATION: Left Ventricle: The left ventricular systolic function is normal, with an estimated ejection fraction of 65-70%. There are no regional wall motion abnormalities. The left " ventricular cavity size is normal. There is left ventricular concentric remodeling. Spectral Doppler shows a normal pattern of left ventricular diastolic filling. Left Atrium: The left atrium is normal in size. There is evidence of an atrial septal aneurysm. Right Ventricle: The right ventricle is normal in size. There is normal right ventricular global systolic function. Right Atrium: The right atrium is normal in size. Aortic Valve: The aortic valve is trileaflet. There is mild aortic valve thickening. There is no evidence of aortic valve regurgitation. The peak instantaneous gradient of the aortic valve is 7.1 mmHg. Mitral Valve: The mitral valve is normal in structure. There is mild mitral annular calcification. There is trace mitral valve regurgitation. Tricuspid Valve: The tricuspid valve was not well visualized. Tricuspid regurgitation was not assessed. The right ventricular systolic pressure is unable to be estimated. Pulmonic Valve: The pulmonic valve is not well visualized. There is physiologic pulmonic valve regurgitation. Pericardium: There is no pericardial effusion noted. There is an anterior clear space. Aorta: The aortic root is abnormal. The aortic root appears mildly dilated and measures 4.10 cm. The Asc Ao is 3.90 cm. There is mild dilatation of the ascending aorta. Systemic Veins: The inferior vena cava appears to be of normal size. There is IVC inspiratory collapse greater than 50%. In comparison to the previous echocardiogram(s): Compared with study from 3/29/2019, no significant change.  CONCLUSIONS:  1. Left ventricular systolic function is normal with a 65-70% estimated ejection fraction.  2. The aortic root appears mildly dilated and measures 4.10 cm. The Asc Ao is 3.90 cm. There is mild dilatation of the ascending aorta.  3. Atrial septal aneurysm present. QUANTITATIVE DATA SUMMARY: 2D MEASUREMENTS:                          Normal Ranges: Ao Root d:     4.10 cm   (2.0-3.7cm) LAs:            3.53 cm   (2.7-4.0cm) IVSd:          1.04 cm   (0.6-1.1cm) LVPWd:         1.01 cm   (0.6-1.1cm) LVIDd:         4.10 cm   (3.9-5.9cm) LV Mass Index: 63.0 g/m2 LA VOLUME:                               Normal Ranges: LA Vol A4C:        30.0 ml    (22+/-6mL/m2) LA Vol A2C:        40.6 ml LA Vol BP:         39.3 ml LA Vol Index A4C:  13.8 ml/m2 LA Vol Index A2C:  18.7 ml/m2 LA Vol Index BP:   18.1 ml/m2 LA Area A4C:       12.6 cm2 LA Area A2C:       16.5 cm2 LA Major Axis A4C: 4.5 cm LA Major Axis A2C: 5.7 cm LA Vol A4C:        27.1 ml LA Vol A2C:        38.2 ml RA VOLUME BY A/L METHOD:                       Normal Ranges: RA Area A4C: 12.8 cm2 AORTA MEASUREMENTS:                    Normal Ranges: Asc Ao, d: 3.90 cm (2.1-3.4cm) LV SYSTOLIC FUNCTION BY 2D PLANIMETRY (MOD):                     Normal Ranges: EF-A4C View: 61.6 % (>=55%) EF-A2C View: 72.6 % EF-Biplane:  66.7 % LV DIASTOLIC FUNCTION:                        Normal Ranges: MV Peak E:    1.31 m/s (0.7-1.2 m/s) MV Peak A:    1.25 m/s (0.42-0.7 m/s) E/A Ratio:    1.05     (1.0-2.2) MV e'         0.08 m/s (>8.0) MV lateral e' 0.11 m/s MV medial e'  0.06 m/s E/e' Ratio:   16.43    (<8.0) MITRAL VALVE:                 Normal Ranges: MV DT: 173 msec (150-240msec) AORTIC VALVE:                         Normal Ranges: AoV Vmax:      1.33 m/s (<=1.7m/s) AoV Peak P.1 mmHg (<20mmHg) LVOT Max Keo:  1.25 m/s (<=1.1m/s) LVOT VTI:      24.46 cm LVOT Diameter: 2.13 cm  (1.8-2.4cm) AoV Area,Vmax: 3.34 cm2 (2.5-4.5cm2)  RIGHT VENTRICLE: RV Basal 3.40 cm RV Mid   2.30 cm RV Major 6.6 cm TAPSE:   18.5 mm RV s'    0.14 m/s TRICUSPID VALVE/RVSP:                   Normal Ranges: IVC Diam: 1.60 cm PULMONIC VALVE:                         Normal Ranges: PV Accel Time: 101 msec (>120ms) PV Max Keo:    0.9 m/s  (0.6-0.9m/s) PV Max PG:     3.3 mmHg AORTA: Asc Ao Diam 3.81 cm  [id number] Torrey Martin MD Electronically signed on 2023 at 12:42:21 PM  ** Final **         Assessment and  Recommendations     Assessment/Plan   1. Coronary artery calcification seen on CT scan  He has no angina and normal LV function.  LDLC at goal.    2. Aneurysm of ascending aorta without rupture (CMS/HCC)  Thoracic aortic aneurysm without rupture: Stable and asymptomatic ascending aortic aneurysm  There is no indication for surgical repair. The patient is tolerating their anti-hypertensive medications including beta blocker and/or ACE/ARB when appropriate to limit expansion and is achieving a goal blood pressure of less than 130/80.  The patient will be schedule for annual surveillance imaging.      3. Benign essential hypertension  Recent OV BP with you well controlled.    4. Atrial septal aneurysm  Incidental finding with no clinical significance in patients without a paradoxical embolism and no need to r/o associated PFO.  No Rx needed.       Alhaji James MD    Exclusive of any other services or procedures performed, I, Alhaji James MD , spent 30 minutes in duration for this visit today.  This time consisted of chart review, obtaining history, and/or performing the exam as documented above as well as documenting the clinical information for the encounter in the electronic record, discussing treatment options, plans, and/or goals with patient, family, and/or caregiver, refilling medications, updating the electronic record, ordering medicines, lab work, imaging, referrals, and/or procedures as documented above and communicating with other [facility] professionals. I have discussed the results of laboratory, radiology, and cardiology studies with the patient and their family/caregiver.     21-Jan-2023 02:36

## 2024-02-06 ENCOUNTER — APPOINTMENT (OUTPATIENT)
Dept: GASTROENTEROLOGY | Facility: CLINIC | Age: 54
End: 2024-02-06
Payer: COMMERCIAL

## 2024-02-06 VITALS
DIASTOLIC BLOOD PRESSURE: 76 MMHG | HEART RATE: 66 BPM | BODY MASS INDEX: 17.84 KG/M2 | HEIGHT: 66 IN | TEMPERATURE: 97.1 F | WEIGHT: 111 LBS | SYSTOLIC BLOOD PRESSURE: 120 MMHG

## 2024-02-06 DIAGNOSIS — K85.90 ACUTE PANCREATITIS WITHOUT NECROSIS OR INFECTION, UNSPECIFIED: ICD-10-CM

## 2024-02-06 DIAGNOSIS — K86.1 OTHER CHRONIC PANCREATITIS: ICD-10-CM

## 2024-02-06 PROCEDURE — 99214 OFFICE O/P EST MOD 30 MIN: CPT | Mod: 25

## 2024-02-06 PROCEDURE — 36415 COLL VENOUS BLD VENIPUNCTURE: CPT

## 2024-02-06 NOTE — ASSESSMENT
[FreeTextEntry1] : Impression: Acute interstitial pancreatitis in a patient with known underlying chronic pancreatitis of unclear etiology, possibly alcohol abuse in the remote past.  No complications such as necrosis or pseudocyst.  Pain essentially resolved at this point.  Status postcholecystectomy.  No evidence of autoimmune pancreatitis or significant hypertriglyceridemia.  Given prodromal symptoms this could have been triggered by a viral illness.  Plan: Will repeat labs including LFT, CBC, amylase, lipase.  Will send stool for elastase to rule out EPI.  Patient may benefit from pancreatic enzymes if low.  Maintain low-fat diet.  No further workup at this time.

## 2024-02-06 NOTE — HISTORY OF PRESENT ILLNESS
[FreeTextEntry1] : Patient admitted to [facility] with acute pancreatitis on January 19.  Elevated lipase.  CAT scan showed acute interstitial pancreatitis with mesenteric streaking but no necrosis.  Treated with bowel rest and IV fluid.  Discharged on 125.  Some residual pain after discharge had to revert to clear liquid diet but feeling better now.  Not requiring any pain medications.  Eating solid food but bland.  Workup once again failed to reveal any specific cause of episode, patient had felt off for a few days with perhaps some viral symptoms prior to onset of abdominal pain, preceded by severe diarrhea.  IgG4 once again normal.  Triglycerides were in the 300s on admission, have never been more than 500 when checked.

## 2024-02-07 LAB
ALBUMIN SERPL ELPH-MCNC: 4.3 G/DL
ALP BLD-CCNC: 74 U/L
ALT SERPL-CCNC: 18 U/L
AMYLASE/CREAT SERPL: 95 U/L
ANION GAP SERPL CALC-SCNC: 10 MMOL/L
AST SERPL-CCNC: 20 U/L
BASOPHILS # BLD AUTO: 0.05 K/UL
BASOPHILS NFR BLD AUTO: 1.1 %
BILIRUB SERPL-MCNC: <0.2 MG/DL
BUN SERPL-MCNC: 19 MG/DL
CALCIUM SERPL-MCNC: 9.6 MG/DL
CHLORIDE SERPL-SCNC: 104 MMOL/L
CO2 SERPL-SCNC: 27 MMOL/L
CREAT SERPL-MCNC: 1.08 MG/DL
EGFR: 62 ML/MIN/1.73M2
EOSINOPHIL # BLD AUTO: 0.03 K/UL
EOSINOPHIL NFR BLD AUTO: 0.6 %
GLUCOSE SERPL-MCNC: 84 MG/DL
HCT VFR BLD CALC: 37.4 %
HGB BLD-MCNC: 11.6 G/DL
IMM GRANULOCYTES NFR BLD AUTO: 0.2 %
LPL SERPL-CCNC: 112 U/L
LYMPHOCYTES # BLD AUTO: 1.55 K/UL
LYMPHOCYTES NFR BLD AUTO: 33.3 %
MAN DIFF?: NORMAL
MCHC RBC-ENTMCNC: 28.7 PG
MCHC RBC-ENTMCNC: 31 GM/DL
MCV RBC AUTO: 92.6 FL
MONOCYTES # BLD AUTO: 0.36 K/UL
MONOCYTES NFR BLD AUTO: 7.7 %
NEUTROPHILS # BLD AUTO: 2.65 K/UL
NEUTROPHILS NFR BLD AUTO: 57.1 %
PLATELET # BLD AUTO: 372 K/UL
POTASSIUM SERPL-SCNC: 4.9 MMOL/L
PROT SERPL-MCNC: 7.1 G/DL
RBC # BLD: 4.04 M/UL
RBC # FLD: 13.3 %
SODIUM SERPL-SCNC: 141 MMOL/L
TRIGL SERPL-MCNC: 207 MG/DL
WBC # FLD AUTO: 4.65 K/UL

## 2024-02-13 ENCOUNTER — APPOINTMENT (OUTPATIENT)
Dept: GASTROENTEROLOGY | Facility: CLINIC | Age: 54
End: 2024-02-13

## 2024-02-15 LAB — PANCREATIC ELASTASE, FECAL: 391 CD:794062645

## 2024-03-30 ENCOUNTER — EMERGENCY (EMERGENCY)
Facility: HOSPITAL | Age: 54
LOS: 1 days | Discharge: ANOTHER HOSPITAL | End: 2024-03-30
Attending: STUDENT IN AN ORGANIZED HEALTH CARE EDUCATION/TRAINING PROGRAM | Admitting: STUDENT IN AN ORGANIZED HEALTH CARE EDUCATION/TRAINING PROGRAM
Payer: COMMERCIAL

## 2024-03-30 ENCOUNTER — INPATIENT (INPATIENT)
Facility: HOSPITAL | Age: 54
LOS: 1 days | Discharge: HOME | DRG: 439 | End: 2024-04-01
Attending: HOSPITALIST | Admitting: INTERNAL MEDICINE
Payer: COMMERCIAL

## 2024-03-30 VITALS
HEART RATE: 76 BPM | RESPIRATION RATE: 19 BRPM | SYSTOLIC BLOOD PRESSURE: 142 MMHG | OXYGEN SATURATION: 99 % | DIASTOLIC BLOOD PRESSURE: 87 MMHG | WEIGHT: 242.51 LBS | TEMPERATURE: 99 F

## 2024-03-30 VITALS
RESPIRATION RATE: 15 BRPM | DIASTOLIC BLOOD PRESSURE: 100 MMHG | SYSTOLIC BLOOD PRESSURE: 159 MMHG | TEMPERATURE: 98 F | OXYGEN SATURATION: 99 % | HEART RATE: 70 BPM

## 2024-03-30 VITALS
WEIGHT: 91.93 LBS | DIASTOLIC BLOOD PRESSURE: 98 MMHG | OXYGEN SATURATION: 98 % | SYSTOLIC BLOOD PRESSURE: 152 MMHG | HEIGHT: 66 IN | TEMPERATURE: 98 F | RESPIRATION RATE: 16 BRPM | HEART RATE: 92 BPM

## 2024-03-30 DIAGNOSIS — Z90.49 ACQUIRED ABSENCE OF OTHER SPECIFIED PARTS OF DIGESTIVE TRACT: Chronic | ICD-10-CM

## 2024-03-30 DIAGNOSIS — K85.90 ACUTE PANCREATITIS WITHOUT NECROSIS OR INFECTION, UNSPECIFIED: ICD-10-CM

## 2024-03-30 DIAGNOSIS — Z98.891 HISTORY OF UTERINE SCAR FROM PREVIOUS SURGERY: Chronic | ICD-10-CM

## 2024-03-30 LAB
ALBUMIN SERPL ELPH-MCNC: 3.5 G/DL — SIGNIFICANT CHANGE UP (ref 3.3–5)
ALBUMIN SERPL ELPH-MCNC: 3.8 G/DL — SIGNIFICANT CHANGE UP (ref 3.3–5)
ALP SERPL-CCNC: 113 U/L — SIGNIFICANT CHANGE UP (ref 40–120)
ALP SERPL-CCNC: 58 U/L — SIGNIFICANT CHANGE UP (ref 30–120)
ALT FLD-CCNC: 179 U/L — HIGH (ref 12–78)
ALT FLD-CCNC: 26 U/L — SIGNIFICANT CHANGE UP (ref 10–60)
ANION GAP SERPL CALC-SCNC: 10 MMOL/L — SIGNIFICANT CHANGE UP (ref 5–17)
ANION GAP SERPL CALC-SCNC: 7 MMOL/L — SIGNIFICANT CHANGE UP (ref 5–17)
APPEARANCE UR: ABNORMAL
AST SERPL-CCNC: 21 U/L — SIGNIFICANT CHANGE UP (ref 10–40)
AST SERPL-CCNC: 381 U/L — HIGH (ref 15–37)
BACTERIA # UR AUTO: ABNORMAL /HPF
BASOPHILS # BLD AUTO: 0.01 K/UL — SIGNIFICANT CHANGE UP (ref 0–0.2)
BASOPHILS # BLD AUTO: 0.02 K/UL — SIGNIFICANT CHANGE UP (ref 0–0.2)
BASOPHILS NFR BLD AUTO: 0.2 % — SIGNIFICANT CHANGE UP (ref 0–2)
BASOPHILS NFR BLD AUTO: 0.4 % — SIGNIFICANT CHANGE UP (ref 0–2)
BILIRUB SERPL-MCNC: 0.6 MG/DL — SIGNIFICANT CHANGE UP (ref 0.2–1.2)
BILIRUB SERPL-MCNC: 0.7 MG/DL — SIGNIFICANT CHANGE UP (ref 0.2–1.2)
BILIRUB UR-MCNC: NEGATIVE — SIGNIFICANT CHANGE UP
BUN SERPL-MCNC: 12 MG/DL — SIGNIFICANT CHANGE UP (ref 7–23)
BUN SERPL-MCNC: 17 MG/DL — SIGNIFICANT CHANGE UP (ref 7–23)
CALCIUM SERPL-MCNC: 8.2 MG/DL — LOW (ref 8.5–10.1)
CALCIUM SERPL-MCNC: 8.9 MG/DL — SIGNIFICANT CHANGE UP (ref 8.4–10.5)
CHLORIDE SERPL-SCNC: 105 MMOL/L — SIGNIFICANT CHANGE UP (ref 96–108)
CHLORIDE SERPL-SCNC: 111 MMOL/L — HIGH (ref 96–108)
CO2 SERPL-SCNC: 26 MMOL/L — SIGNIFICANT CHANGE UP (ref 22–31)
CO2 SERPL-SCNC: 27 MMOL/L — SIGNIFICANT CHANGE UP (ref 22–31)
COLOR SPEC: YELLOW — SIGNIFICANT CHANGE UP
CREAT SERPL-MCNC: 0.8 MG/DL — SIGNIFICANT CHANGE UP (ref 0.5–1.3)
CREAT SERPL-MCNC: 0.99 MG/DL — SIGNIFICANT CHANGE UP (ref 0.5–1.3)
D DIMER BLD IA.RAPID-MCNC: <150 NG/ML DDU — SIGNIFICANT CHANGE UP
DIFF PNL FLD: NEGATIVE — SIGNIFICANT CHANGE UP
EGFR: 69 ML/MIN/1.73M2 — SIGNIFICANT CHANGE UP
EGFR: 89 ML/MIN/1.73M2 — SIGNIFICANT CHANGE UP
EOSINOPHIL # BLD AUTO: 0.03 K/UL — SIGNIFICANT CHANGE UP (ref 0–0.5)
EOSINOPHIL # BLD AUTO: 0.05 K/UL — SIGNIFICANT CHANGE UP (ref 0–0.5)
EOSINOPHIL NFR BLD AUTO: 0.5 % — SIGNIFICANT CHANGE UP (ref 0–6)
EOSINOPHIL NFR BLD AUTO: 1 % — SIGNIFICANT CHANGE UP (ref 0–6)
EPI CELLS # UR: PRESENT
GLUCOSE SERPL-MCNC: 103 MG/DL — HIGH (ref 70–99)
GLUCOSE SERPL-MCNC: 94 MG/DL — SIGNIFICANT CHANGE UP (ref 70–99)
GLUCOSE UR QL: NEGATIVE MG/DL — SIGNIFICANT CHANGE UP
HCG UR QL: NEGATIVE — SIGNIFICANT CHANGE UP
HCT VFR BLD CALC: 35.9 % — SIGNIFICANT CHANGE UP (ref 34.5–45)
HCT VFR BLD CALC: 38.4 % — SIGNIFICANT CHANGE UP (ref 34.5–45)
HGB BLD-MCNC: 12 G/DL — SIGNIFICANT CHANGE UP (ref 11.5–15.5)
HGB BLD-MCNC: 12.7 G/DL — SIGNIFICANT CHANGE UP (ref 11.5–15.5)
IMM GRANULOCYTES NFR BLD AUTO: 0.2 % — SIGNIFICANT CHANGE UP (ref 0–0.9)
IMM GRANULOCYTES NFR BLD AUTO: 0.2 % — SIGNIFICANT CHANGE UP (ref 0–0.9)
KETONES UR-MCNC: NEGATIVE MG/DL — SIGNIFICANT CHANGE UP
LEUKOCYTE ESTERASE UR-ACNC: ABNORMAL
LIDOCAIN IGE QN: 1904 U/L — HIGH (ref 13–75)
LIDOCAIN IGE QN: >375 U/L — HIGH (ref 16–77)
LYMPHOCYTES # BLD AUTO: 1.15 K/UL — SIGNIFICANT CHANGE UP (ref 1–3.3)
LYMPHOCYTES # BLD AUTO: 1.5 K/UL — SIGNIFICANT CHANGE UP (ref 1–3.3)
LYMPHOCYTES # BLD AUTO: 20.8 % — SIGNIFICANT CHANGE UP (ref 13–44)
LYMPHOCYTES # BLD AUTO: 30.7 % — SIGNIFICANT CHANGE UP (ref 13–44)
MCHC RBC-ENTMCNC: 28 PG — SIGNIFICANT CHANGE UP (ref 27–34)
MCHC RBC-ENTMCNC: 28.2 PG — SIGNIFICANT CHANGE UP (ref 27–34)
MCHC RBC-ENTMCNC: 33.1 GM/DL — SIGNIFICANT CHANGE UP (ref 32–36)
MCHC RBC-ENTMCNC: 33.4 GM/DL — SIGNIFICANT CHANGE UP (ref 32–36)
MCV RBC AUTO: 84.3 FL — SIGNIFICANT CHANGE UP (ref 80–100)
MCV RBC AUTO: 84.8 FL — SIGNIFICANT CHANGE UP (ref 80–100)
MONOCYTES # BLD AUTO: 0.37 K/UL — SIGNIFICANT CHANGE UP (ref 0–0.9)
MONOCYTES # BLD AUTO: 0.38 K/UL — SIGNIFICANT CHANGE UP (ref 0–0.9)
MONOCYTES NFR BLD AUTO: 6.9 % — SIGNIFICANT CHANGE UP (ref 2–14)
MONOCYTES NFR BLD AUTO: 7.6 % — SIGNIFICANT CHANGE UP (ref 2–14)
NEUTROPHILS # BLD AUTO: 2.93 K/UL — SIGNIFICANT CHANGE UP (ref 1.8–7.4)
NEUTROPHILS # BLD AUTO: 3.95 K/UL — SIGNIFICANT CHANGE UP (ref 1.8–7.4)
NEUTROPHILS NFR BLD AUTO: 60.1 % — SIGNIFICANT CHANGE UP (ref 43–77)
NEUTROPHILS NFR BLD AUTO: 71.4 % — SIGNIFICANT CHANGE UP (ref 43–77)
NITRITE UR-MCNC: NEGATIVE — SIGNIFICANT CHANGE UP
NRBC # BLD: 0 /100 WBCS — SIGNIFICANT CHANGE UP (ref 0–0)
NRBC # BLD: 0 /100 WBCS — SIGNIFICANT CHANGE UP (ref 0–0)
PH UR: 6 — SIGNIFICANT CHANGE UP (ref 5–8)
PLATELET # BLD AUTO: 204 K/UL — SIGNIFICANT CHANGE UP (ref 150–400)
PLATELET # BLD AUTO: 249 K/UL — SIGNIFICANT CHANGE UP (ref 150–400)
POTASSIUM SERPL-MCNC: 3.4 MMOL/L — LOW (ref 3.5–5.3)
POTASSIUM SERPL-MCNC: 3.8 MMOL/L — SIGNIFICANT CHANGE UP (ref 3.5–5.3)
POTASSIUM SERPL-SCNC: 3.4 MMOL/L — LOW (ref 3.5–5.3)
POTASSIUM SERPL-SCNC: 3.8 MMOL/L — SIGNIFICANT CHANGE UP (ref 3.5–5.3)
PROT SERPL-MCNC: 6.6 G/DL — SIGNIFICANT CHANGE UP (ref 6–8.3)
PROT SERPL-MCNC: 7 G/DL — SIGNIFICANT CHANGE UP (ref 6–8.3)
PROT UR-MCNC: NEGATIVE MG/DL — SIGNIFICANT CHANGE UP
RBC # BLD: 4.26 M/UL — SIGNIFICANT CHANGE UP (ref 3.8–5.2)
RBC # BLD: 4.53 M/UL — SIGNIFICANT CHANGE UP (ref 3.8–5.2)
RBC # FLD: 15.1 % — HIGH (ref 10.3–14.5)
RBC # FLD: 15.3 % — HIGH (ref 10.3–14.5)
RBC CASTS # UR COMP ASSIST: 2 /HPF — SIGNIFICANT CHANGE UP (ref 0–4)
SODIUM SERPL-SCNC: 142 MMOL/L — SIGNIFICANT CHANGE UP (ref 135–145)
SODIUM SERPL-SCNC: 144 MMOL/L — SIGNIFICANT CHANGE UP (ref 135–145)
SP GR SPEC: 1.02 — SIGNIFICANT CHANGE UP (ref 1–1.03)
TROPONIN I, HIGH SENSITIVITY RESULT: <4 NG/L — SIGNIFICANT CHANGE UP
UROBILINOGEN FLD QL: 0.2 MG/DL — SIGNIFICANT CHANGE UP (ref 0.2–1)
WBC # BLD: 4.88 K/UL — SIGNIFICANT CHANGE UP (ref 3.8–10.5)
WBC # BLD: 5.53 K/UL — SIGNIFICANT CHANGE UP (ref 3.8–10.5)
WBC # FLD AUTO: 4.88 K/UL — SIGNIFICANT CHANGE UP (ref 3.8–10.5)
WBC # FLD AUTO: 5.53 K/UL — SIGNIFICANT CHANGE UP (ref 3.8–10.5)
WBC UR QL: 3 /HPF — SIGNIFICANT CHANGE UP (ref 0–5)

## 2024-03-30 PROCEDURE — 85025 COMPLETE CBC W/AUTO DIFF WBC: CPT

## 2024-03-30 PROCEDURE — 83690 ASSAY OF LIPASE: CPT

## 2024-03-30 PROCEDURE — 80053 COMPREHEN METABOLIC PANEL: CPT

## 2024-03-30 PROCEDURE — 96374 THER/PROPH/DIAG INJ IV PUSH: CPT | Mod: XU

## 2024-03-30 PROCEDURE — 81001 URINALYSIS AUTO W/SCOPE: CPT

## 2024-03-30 PROCEDURE — 96375 TX/PRO/DX INJ NEW DRUG ADDON: CPT

## 2024-03-30 PROCEDURE — 74177 CT ABD & PELVIS W/CONTRAST: CPT | Mod: 26,MC

## 2024-03-30 PROCEDURE — 71046 X-RAY EXAM CHEST 2 VIEWS: CPT | Mod: 26

## 2024-03-30 PROCEDURE — 99285 EMERGENCY DEPT VISIT HI MDM: CPT | Mod: 25

## 2024-03-30 PROCEDURE — 74177 CT ABD & PELVIS W/CONTRAST: CPT | Mod: MC

## 2024-03-30 PROCEDURE — 81025 URINE PREGNANCY TEST: CPT

## 2024-03-30 PROCEDURE — 93005 ELECTROCARDIOGRAM TRACING: CPT

## 2024-03-30 PROCEDURE — 85379 FIBRIN DEGRADATION QUANT: CPT

## 2024-03-30 PROCEDURE — 84484 ASSAY OF TROPONIN QUANT: CPT

## 2024-03-30 PROCEDURE — 99223 1ST HOSP IP/OBS HIGH 75: CPT

## 2024-03-30 PROCEDURE — 93010 ELECTROCARDIOGRAM REPORT: CPT

## 2024-03-30 PROCEDURE — 71046 X-RAY EXAM CHEST 2 VIEWS: CPT

## 2024-03-30 PROCEDURE — 99285 EMERGENCY DEPT VISIT HI MDM: CPT

## 2024-03-30 PROCEDURE — 96361 HYDRATE IV INFUSION ADD-ON: CPT

## 2024-03-30 PROCEDURE — 36415 COLL VENOUS BLD VENIPUNCTURE: CPT

## 2024-03-30 PROCEDURE — 96376 TX/PRO/DX INJ SAME DRUG ADON: CPT

## 2024-03-30 RX ORDER — MORPHINE SULFATE 50 MG/1
2 CAPSULE, EXTENDED RELEASE ORAL EVERY 4 HOURS
Refills: 0 | Status: DISCONTINUED | OUTPATIENT
Start: 2024-03-30 | End: 2024-04-01

## 2024-03-30 RX ORDER — LIDOCAINE 4 G/100G
10 CREAM TOPICAL ONCE
Refills: 0 | Status: COMPLETED | OUTPATIENT
Start: 2024-03-30 | End: 2024-03-30

## 2024-03-30 RX ORDER — MORPHINE SULFATE 50 MG/1
4 CAPSULE, EXTENDED RELEASE ORAL ONCE
Refills: 0 | Status: DISCONTINUED | OUTPATIENT
Start: 2024-03-30 | End: 2024-03-30

## 2024-03-30 RX ORDER — LANOLIN ALCOHOL/MO/W.PET/CERES
3 CREAM (GRAM) TOPICAL AT BEDTIME
Refills: 0 | Status: DISCONTINUED | OUTPATIENT
Start: 2024-03-30 | End: 2024-04-03

## 2024-03-30 RX ORDER — PANTOPRAZOLE SODIUM 20 MG/1
40 TABLET, DELAYED RELEASE ORAL
Refills: 0 | Status: DISCONTINUED | OUTPATIENT
Start: 2024-03-30 | End: 2024-04-03

## 2024-03-30 RX ORDER — ACETAMINOPHEN 500 MG
650 TABLET ORAL EVERY 6 HOURS
Refills: 0 | Status: DISCONTINUED | OUTPATIENT
Start: 2024-03-30 | End: 2024-04-01

## 2024-03-30 RX ORDER — ONDANSETRON 8 MG/1
4 TABLET, FILM COATED ORAL EVERY 8 HOURS
Refills: 0 | Status: DISCONTINUED | OUTPATIENT
Start: 2024-03-30 | End: 2024-04-01

## 2024-03-30 RX ORDER — MORPHINE SULFATE 50 MG/1
2 CAPSULE, EXTENDED RELEASE ORAL
Refills: 0 | Status: DISCONTINUED | OUTPATIENT
Start: 2024-03-30 | End: 2024-03-30

## 2024-03-30 RX ORDER — LANOLIN ALCOHOL/MO/W.PET/CERES
3 CREAM (GRAM) TOPICAL AT BEDTIME
Refills: 0 | Status: DISCONTINUED | OUTPATIENT
Start: 2024-03-30 | End: 2024-04-01

## 2024-03-30 RX ORDER — MORPHINE SULFATE 50 MG/1
2 CAPSULE, EXTENDED RELEASE ORAL ONCE
Refills: 0 | Status: DISCONTINUED | OUTPATIENT
Start: 2024-03-30 | End: 2024-03-30

## 2024-03-30 RX ORDER — SODIUM CHLORIDE 9 MG/ML
1000 INJECTION INTRAMUSCULAR; INTRAVENOUS; SUBCUTANEOUS ONCE
Refills: 0 | Status: COMPLETED | OUTPATIENT
Start: 2024-03-30 | End: 2024-03-30

## 2024-03-30 RX ORDER — SODIUM CHLORIDE 9 MG/ML
1000 INJECTION, SOLUTION INTRAVENOUS
Refills: 0 | Status: DISCONTINUED | OUTPATIENT
Start: 2024-03-30 | End: 2024-04-03

## 2024-03-30 RX ORDER — FAMOTIDINE 10 MG/ML
20 INJECTION INTRAVENOUS ONCE
Refills: 0 | Status: COMPLETED | OUTPATIENT
Start: 2024-03-30 | End: 2024-03-30

## 2024-03-30 RX ORDER — ONDANSETRON 8 MG/1
4 TABLET, FILM COATED ORAL EVERY 8 HOURS
Refills: 0 | Status: DISCONTINUED | OUTPATIENT
Start: 2024-03-30 | End: 2024-04-03

## 2024-03-30 RX ORDER — ENOXAPARIN SODIUM 100 MG/ML
40 INJECTION SUBCUTANEOUS EVERY 24 HOURS
Refills: 0 | Status: DISCONTINUED | OUTPATIENT
Start: 2024-03-30 | End: 2024-04-01

## 2024-03-30 RX ORDER — SODIUM CHLORIDE 9 MG/ML
1000 INJECTION, SOLUTION INTRAVENOUS
Refills: 0 | Status: DISCONTINUED | OUTPATIENT
Start: 2024-03-30 | End: 2024-03-31

## 2024-03-30 RX ORDER — HYDRALAZINE HCL 50 MG
10 TABLET ORAL ONCE
Refills: 0 | Status: COMPLETED | OUTPATIENT
Start: 2024-03-30 | End: 2024-03-30

## 2024-03-30 RX ORDER — PANTOPRAZOLE SODIUM 20 MG/1
40 TABLET, DELAYED RELEASE ORAL
Refills: 0 | Status: DISCONTINUED | OUTPATIENT
Start: 2024-03-30 | End: 2024-04-01

## 2024-03-30 RX ORDER — ACETAMINOPHEN 500 MG
650 TABLET ORAL EVERY 6 HOURS
Refills: 0 | Status: DISCONTINUED | OUTPATIENT
Start: 2024-03-30 | End: 2024-04-03

## 2024-03-30 RX ORDER — LIPASE/PROTEASE/AMYLASE 16-48-48K
2 CAPSULE,DELAYED RELEASE (ENTERIC COATED) ORAL
Refills: 0 | Status: DISCONTINUED | OUTPATIENT
Start: 2024-03-30 | End: 2024-04-01

## 2024-03-30 RX ORDER — HYDRALAZINE HCL 50 MG
10 TABLET ORAL EVERY 6 HOURS
Refills: 0 | Status: DISCONTINUED | OUTPATIENT
Start: 2024-03-30 | End: 2024-03-31

## 2024-03-30 RX ORDER — ONDANSETRON 8 MG/1
4 TABLET, FILM COATED ORAL ONCE
Refills: 0 | Status: COMPLETED | OUTPATIENT
Start: 2024-03-30 | End: 2024-03-30

## 2024-03-30 RX ORDER — AMLODIPINE BESYLATE 2.5 MG/1
5 TABLET ORAL ONCE
Refills: 0 | Status: COMPLETED | OUTPATIENT
Start: 2024-03-30 | End: 2024-03-30

## 2024-03-30 RX ADMIN — MORPHINE SULFATE 4 MILLIGRAM(S): 50 CAPSULE, EXTENDED RELEASE ORAL at 15:37

## 2024-03-30 RX ADMIN — SODIUM CHLORIDE 1000 MILLILITER(S): 9 INJECTION INTRAMUSCULAR; INTRAVENOUS; SUBCUTANEOUS at 14:18

## 2024-03-30 RX ADMIN — MORPHINE SULFATE 2 MILLIGRAM(S): 50 CAPSULE, EXTENDED RELEASE ORAL at 21:45

## 2024-03-30 RX ADMIN — MORPHINE SULFATE 4 MILLIGRAM(S): 50 CAPSULE, EXTENDED RELEASE ORAL at 13:49

## 2024-03-30 RX ADMIN — MORPHINE SULFATE 2 MILLIGRAM(S): 50 CAPSULE, EXTENDED RELEASE ORAL at 22:16

## 2024-03-30 RX ADMIN — ONDANSETRON 4 MILLIGRAM(S): 8 TABLET, FILM COATED ORAL at 13:19

## 2024-03-30 RX ADMIN — SODIUM CHLORIDE 125 MILLILITER(S): 9 INJECTION, SOLUTION INTRAVENOUS at 21:46

## 2024-03-30 RX ADMIN — LIDOCAINE 10 MILLILITER(S): 4 CREAM TOPICAL at 13:19

## 2024-03-30 RX ADMIN — MORPHINE SULFATE 2 MILLIGRAM(S): 50 CAPSULE, EXTENDED RELEASE ORAL at 16:54

## 2024-03-30 RX ADMIN — MORPHINE SULFATE 4 MILLIGRAM(S): 50 CAPSULE, EXTENDED RELEASE ORAL at 13:19

## 2024-03-30 RX ADMIN — FAMOTIDINE 20 MILLIGRAM(S): 10 INJECTION INTRAVENOUS at 13:18

## 2024-03-30 RX ADMIN — MORPHINE SULFATE 2 MILLIGRAM(S): 50 CAPSULE, EXTENDED RELEASE ORAL at 17:24

## 2024-03-30 RX ADMIN — AMLODIPINE BESYLATE 5 MILLIGRAM(S): 2.5 TABLET ORAL at 18:09

## 2024-03-30 RX ADMIN — MORPHINE SULFATE 4 MILLIGRAM(S): 50 CAPSULE, EXTENDED RELEASE ORAL at 15:07

## 2024-03-30 RX ADMIN — Medication 30 MILLILITER(S): at 13:19

## 2024-03-30 RX ADMIN — Medication 10 MILLIGRAM(S): at 18:32

## 2024-03-30 RX ADMIN — ENOXAPARIN SODIUM 40 MILLIGRAM(S): 100 INJECTION SUBCUTANEOUS at 21:46

## 2024-03-30 RX ADMIN — SODIUM CHLORIDE 1000 MILLILITER(S): 9 INJECTION INTRAMUSCULAR; INTRAVENOUS; SUBCUTANEOUS at 13:18

## 2024-03-30 NOTE — ED ADULT TRIAGE NOTE - CHIEF COMPLAINT QUOTE
pt states that she has upper right abdominal pain since last night that radiates to her right flank.

## 2024-03-30 NOTE — H&P ADULT - HISTORY OF PRESENT ILLNESS
52 y/o female with PMH of HTN, Pancreatitis s/p lap enoch/appendix presented to [place] ED to be evaluated for abdnominal pain associated with nausea that started about 1 day ago. symptoms started after having lunch. states pain radiating towards back and right upper shoulder.  describes  her pain as "bruising in the abdomen"  pt tried to ease her pain by taking bread for dinner but symptoms persisted. denies any fever,chills or change in bowel habit.   pt had previous pancreatitis about 20 years ago and states she underwent EGD after that which was unremarkable.   denies any hx of drug/alcohol abuse. denies taking  any wt loss medication in recent weeks     ED course:  lipase >375  CT ab/pel:  Acute on chronic pancreatitis.  Obstructive stones at the ampulla with the main pancreatic ductal   dilatation. Additional obstructive stone at the minor papilla measuring   0.3 cm. Recommend GI consult and short-term follow-up abdominal MR for   further evaluation of pancreatic ductal dilatation.    pt ro be admitted for MRCP with GI consultation

## 2024-03-30 NOTE — CONSULT NOTE ADULT - SUBJECTIVE AND OBJECTIVE BOX
Chief Complaint:  Patient is a 53y old  Female who presents with a chief complaint of abdominal pain, nausea called to see ot for pancreatitis  History of Present Illness:   54 y/o female with PMH of HTN, Pancreatitis s/p lap enoch/appendix presented to [place] ED to be evaluated for abdnominal pain associated with nausea that started about 1 day ago. symptoms started after having lunch. states pain radiating towards back and right upper shoulder.  describes  her pain as "bruising in the abdomen"  pt tried to ease her pain by taking bread for dinner but symptoms persisted. denies any fever,chills or change in bowel habit.   pt had previous pancreatitis about 20 years ago and states she underwent EGD after that which was unremarkable.   denies any hx of drug/alcohol abuse. denies taking  any wt loss medication in recent weeks     ED course:  lipase >375  CT ab/pel:  Acute on chronic pancreatitis.  Obstructive stones at the ampulla with the main pancreatic ductal   dilatation. Additional obstructive stone at the minor papilla measuring   0.3 cm. Recommend GI consult and short-term follow-up abdominal MR for   further evaluation of pancreatic ductal dilatation.    pt ro be admitted for MRCP with GI consultation            Review of Systems:  Review of Systems: all 12 points of ROS examined. neg exc mentioned  :    Allergies:  penicillin (Unknown)      Medications:  acetaminophen     Tablet .. 650 milliGRAM(s) Oral every 6 hours PRN  aluminum hydroxide/magnesium hydroxide/simethicone Suspension 30 milliLiter(s) Oral every 4 hours PRN  enoxaparin Injectable 40 milliGRAM(s) SubCutaneous every 24 hours  hydrALAZINE Injectable 10 milliGRAM(s) IV Push every 6 hours PRN  lactated ringers. 1000 milliLiter(s) IV Continuous <Continuous>  melatonin 3 milliGRAM(s) Oral at bedtime PRN  morphine  - Injectable 2 milliGRAM(s) IV Push every 4 hours PRN  ondansetron Injectable 4 milliGRAM(s) IV Push every 8 hours PRN  pantoprazole    Tablet 40 milliGRAM(s) Oral before breakfast      PMHX/PSHX:  Depression    GERD (gastroesophageal reflux disease)    No significant past surgical history        Family history:    no pancreatitis    Social History:   lives at home no etoh no cigs    ROS:     General:  No wt loss, fevers, chills, night sweats, fatigue,   Eyes:  Good vision, no reported pain  ENT:  No sore throat, pain, runny nose, dysphagia  CV:  No pain, palpitations, hypo/hypertension  Resp:  No dyspnea, cough, tachypnea, wheezing  GI:  No pain, No nausea, No vomiting, No diarrhea, No constipation, No weight loss, No fever, No pruritis, No rectal bleeding, No tarry stools, No dysphagia,  :  No pain, bleeding, incontinence, nocturia  Muscle:  No pain, weakness  Neuro:  No weakness, tingling, memory problems  Psych:  No fatigue, insomnia, mood problems, depression  Endocrine:  No polyuria, polydipsia, cold/heat intolerance  Heme:  No petechiae, ecchymosis, easy bruisability  Skin:  No rash, tattoos, scars, edema      PHYSICAL EXAM:   Vital Signs:  Vital Signs Last 24 Hrs  T(C): 37.1 (30 Mar 2024 19:15), Max: 37.1 (30 Mar 2024 19:15)  T(F): 98.7 (30 Mar 2024 19:15), Max: 98.7 (30 Mar 2024 19:15)  HR: 76 (30 Mar 2024 19:15) (76 - 76)  BP: 142/87 (30 Mar 2024 19:15) (142/87 - 142/87)  BP(mean): --  RR: 19 (30 Mar 2024 19:15) (19 - 19)  SpO2: 99% (30 Mar 2024 19:15) (99% - 99%)      Daily     Daily Weight in k (30 Mar 2024 19:15)    GENERAL:  Appears stated age, well-groomed, well-nourished, no distress  HEENT:  NC/AT,  conjunctivae clear and pink, no thyromegaly, nodules, adenopathy, no JVD, sclera -anicteric  CHEST:  Full & symmetric excursion, no increased effort, breath sounds clear  HEART:  Regular rhythm, S1, S2, no murmur/rub/S3/S4, no abdominal bruit, no edema  ABDOMEN:  Soft, non-tender, non-distended, normoactive bowel sounds,  no masses ,no hepato-splenomegaly, no signs of chronic liver disease  EXTEREMITIES:  no cyanosis,clubbing or edema  SKIN:  No rash/erythema/ecchymoses/petechiae/wounds/abscess/warm/dry  NEURO:  Alert, oriented, no asterixis, no tremor, no encephalopathy    LABS:                        12.0   4.88  )-----------( 204      ( 30 Mar 2024 21:30 )             35.9     -30    144  |  111<H>  |  12  ----------------------------<  94  3.4<L>   |  26  |  0.80    Ca    8.2<L>      30 Mar 2024 21:30    TPro  6.6  /  Alb  3.5  /  TBili  0.7  /  DBili  x   /  AST  381<H>  /  ALT  179<H>  /  AlkPhos  113      LIVER FUNCTIONS - ( 30 Mar 2024 21:30 )  Alb: 3.5 g/dL / Pro: 6.6 g/dL / ALK PHOS: 113 U/L / ALT: 179 U/L / AST: 381 U/L / GGT: x             Urinalysis Basic - ( 30 Mar 2024 21:30 )    Color: x / Appearance: x / SG: x / pH: x  Gluc: 94 mg/dL / Ketone: x  / Bili: x / Urobili: x   Blood: x / Protein: x / Nitrite: x   Leuk Esterase: x / RBC: x / WBC x   Sq Epi: x / Non Sq Epi: x / Bacteria: x      Amylase Serum--      Lipase [id number]       Ammonia--      Imaging:

## 2024-03-30 NOTE — ED ADULT NURSE NOTE - OBJECTIVE STATEMENT
Pt is alert and oriented. Pt states that she has been having right upper quadrant abdominal pain that radiates to her right flank since last night. Pt states that she has a hx of pancreatitis and kidney stones. Pt denies burning with urination or dysuria. Pt denies fevers, sob, chest pain, nausea, vomiting, and dizziness. Pt resp are even and unlabored, skin color edmund for race. Pt updated on plan of care.

## 2024-03-30 NOTE — PROVIDER CONTACT NOTE (OTHER) - REASON
Informed provider not sure when pt received her last dose of morphine as she was transferred from [place]

## 2024-03-30 NOTE — PROVIDER CONTACT NOTE (OTHER) - SITUATION
Informed provider not sure when pt received her last dose of morphine as she was transferred from [place]. Informed provider pt stated she got it 3pm. Asked if she can get next dose at this time?
No

## 2024-03-30 NOTE — CONSULT NOTE ADULT - ASSESSMENT
pancreatitis   abd pain    plan  in and out  npo  ivf  check imaging us/ct scan  will order mri  ivf aggressively  ppi once a day  serial lft and amylase and lipase and r/o other causes of pancreatitis autoimmune  creon

## 2024-03-30 NOTE — ED PROVIDER NOTE - ATTENDING APP SHARED VISIT CONTRIBUTION OF CARE
Saroj Suggs MD (Attending Physician):    I performed a history and physical exam of the patient and discussed their management with the LUX. I have reviewed the LUX note and agree with the documented findings and plan of care, except as noted. This was a shared visit with an LUX. I reviewed and verified the documentation and independently performed my own history/exam/medical decision making. My medical decision making and observations are found below. Please refer to any progress notes for updates on clinical course.    HPI:  53-year-old female with history of hypertension presents with epigastric pain since yesterday, worse since last night.  States pain will radiate to right upper back.  Positive nausea, no vomiting.  Reports 4 episodes of loose stools yesterday and 2 episodes today.  No fevers, chills, body aches.  No urinary complaints.  Patient reports history of "stomach issues".  Similar symptoms in the past.  History of gastritis in the past, kidney stones, and pancreatitis.  Previous abdominal surgeries include , appendectomy, cholecystectomy.  Patient has been seen in emergency room and GI multiple times for same pain.  Patient states she saw a specialist and is scheduled to have a celiac plexus block for her abdominal pain in May.  Patient states pain currently 6 out of 10.  PCP Harjinder Joiner  GI pain neurologist [facility]    PE:  GEN - NAD, well appearing, A&Ox3  HEAD - NC/AT  EYES - PERRL, EOMI  ENT - Airway patent, +mucous membranes dry  PULMONARY - CTA b/l, symmetric breath sounds, no W/R/R  CARDIAC - +S1S2, RRR, no M/G/R, no JVD  ABDOMEN - +BS, ND, +TTP in RUQ and epigastric area, soft, no guarding, no rebound, no masses, no rigidity   - No CVA TTP b/l  EXTREMITIES - FROM, symmetric pulses, no edema  SKIN - No rash or bruising  NEUROLOGIC - Alert, speech clear, no focal deficits  PSYCH - Normal mood/affect, normal insight    MDM:  DDx includes, but not limited to: pancreatitis, gastritis, choledocholithiasis, atypical ACS, PE, UTI. ekg, cxr, CT a/p, labs, urine, zofran, pepcid, pain control, IVF. Dispo pending w/u.

## 2024-03-30 NOTE — ED PROVIDER NOTE - RESPIRATORY NEGATIVE STATEMENT, MLM
no current shortness of breath (reports slight shortness of breath related to "anxiety" from the pain occ)

## 2024-03-30 NOTE — PATIENT PROFILE ADULT - FALL HARM RISK - UNIVERSAL INTERVENTIONS
Bed in lowest position, wheels locked, appropriate side rails in place/Call bell, personal items and telephone in reach/Instruct patient to call for assistance before getting out of bed or chair/Non-slip footwear when patient is out of bed/[place] to call system/Physically safe environment - no spills, clutter or unnecessary equipment/Purposeful Proactive Rounding/Room/bathroom lighting operational, light cord in reach

## 2024-03-30 NOTE — ED PROVIDER NOTE - CROS ED GU ALL NEG
Received a request to change the omeprazole to the compounded form of the medication from the pharmacy received.    New script sent.    Encounter Closed     negative...

## 2024-03-30 NOTE — ED PROVIDER NOTE - OBJECTIVE STATEMENT
53-year-old female with history of hypertension presents with epigastric pain since yesterday, worse since last night.  States pain will radiate to right upper back.  Positive nausea, no vomiting.  Reports 4 episodes of loose stools yesterday and 2 episodes today.  No fevers, chills, body aches.  No urinary complaints.  Patient reports history of "stomach issues".  Similar symptoms in the past.  History of gastritis in the past, kidney stones, and pancreatitis.  Previous abdominal surgeries include , appendectomy, cholecystectomy.  Patient has been seen in emergency room and GI multiple times for same pain.  Patient states she saw a specialist and is scheduled to have a celiac plexus block for her abdominal pain in May.  Patient states pain currently 6 out of 10.  PCP Harjinder Joiner  GI pain neurologist [facility]

## 2024-03-30 NOTE — ED ADULT NURSE NOTE - NSFALLUNIVINTERV_ED_ALL_ED
Bed/Stretcher in lowest position, wheels locked, appropriate side rails in place/Call bell, personal items and telephone in reach/Instruct patient to call for assistance before getting out of bed/chair/stretcher/Non-slip footwear applied when patient is off stretcher/[place] to call system/Physically safe environment - no spills, clutter or unnecessary equipment/Purposeful proactive rounding/Room/bathroom lighting operational, light cord in reach

## 2024-03-30 NOTE — ED PROVIDER NOTE - PROGRESS NOTE DETAILS
Visitor to bedside.   Patient stable.  Resting comfortably.  Pain improved after morphine.  Labs and CAT scan results discussed with patient.  Lipase elevated.  CAT scan shows stones and pancreatic duct.  CAT scan shows acute on chronic pancreatitis.  Spoke with Dr. Cassidy.  Prefers patient to be transferred to [place] for possible MRCP and ERCP.  Spoke with transfer center and Attending hospitalist, Dr. Szymanski.   Dr. Szymanski accepts patient for transfer Patient stable.  Resting comfortably.  Pain improved after morphine.  Labs and CAT scan results discussed with patient.  Lipase elevated.  CAT scan shows stones and pancreatic duct.  CAT scan shows acute on chronic pancreatitis.  Spoke with Dr. Cassidy.  Prefers patient to be transferred to [place] for possible MRCP and ERCP.  Spoke with transfer center and Attending hospitalist, Dr. Szymanski.   Dr. Szymanski accepts patient for transfer.

## 2024-03-30 NOTE — ED PROVIDER NOTE - DIFFERENTIAL DIAGNOSIS
Differential Diagnosis Differentials include but not limited to gastritis, enteritis, pancreatitis, blocked biliary stone, gastroparesis, IBS

## 2024-03-30 NOTE — H&P ADULT - NSHPPHYSICALEXAM_GEN_ALL_CORE
constitutional: NAD  HEENT: AT/NC  CV: RRR, Normal S1S2  Respi: CTA B/L, No rhonchi/rales or wheezing   Abd: TTP in midepigastric region. neg for guarding or rebound tenderness. Pos for BS, Neg for keane  Ext: no edema  Neuro: A and O X 3

## 2024-03-30 NOTE — ED PROVIDER NOTE - GASTROINTESTINAL, MLM
Abdomen soft, mild diffuse upper abd tenderness. no distention. no rebound or guarding Abdomen soft, mild diffuse upper abd tenderness (more epigastric and RUQ). no distention. no rebound or guarding

## 2024-03-30 NOTE — H&P ADULT - ASSESSMENT
Acute on chronic pancreatitis:  -CT abd/pel:  Acute on chronic pancreatitis.  Obstructive stones at the ampulla with the main pancreatic ductal   dilatation. Additional obstructive stone at the minor papilla measuring   0.3 cm. Recommend GI consult and short-term follow-up abdominal MR for   further evaluation of pancreatic ductal dilatation.    -C/w IVF  -NPO  -LFT's WNL. Lipase >375  -obtain lipid panel  -pain control with morphine 2 mg q4h PRN for pain  -MRCP in AM  -GI consult  -DVT ppx    HTN:  -C/w hydralazine 10 mg q6h PRN for SBP>160    Full code

## 2024-03-31 LAB
ALBUMIN SERPL ELPH-MCNC: 3.4 G/DL — SIGNIFICANT CHANGE UP (ref 3.3–5)
ALP SERPL-CCNC: 107 U/L — SIGNIFICANT CHANGE UP (ref 40–120)
ALT FLD-CCNC: 165 U/L — HIGH (ref 12–78)
ANION GAP SERPL CALC-SCNC: 6 MMOL/L — SIGNIFICANT CHANGE UP (ref 5–17)
AST SERPL-CCNC: 224 U/L — HIGH (ref 15–37)
BASOPHILS # BLD AUTO: 0.02 K/UL — SIGNIFICANT CHANGE UP (ref 0–0.2)
BASOPHILS NFR BLD AUTO: 0.5 % — SIGNIFICANT CHANGE UP (ref 0–2)
BILIRUB SERPL-MCNC: 0.8 MG/DL — SIGNIFICANT CHANGE UP (ref 0.2–1.2)
BUN SERPL-MCNC: 10 MG/DL — SIGNIFICANT CHANGE UP (ref 7–23)
CALCIUM SERPL-MCNC: 8.9 MG/DL — SIGNIFICANT CHANGE UP (ref 8.5–10.1)
CHLORIDE SERPL-SCNC: 110 MMOL/L — HIGH (ref 96–108)
CHOLEST SERPL-MCNC: 164 MG/DL — SIGNIFICANT CHANGE UP
CO2 SERPL-SCNC: 28 MMOL/L — SIGNIFICANT CHANGE UP (ref 22–31)
CREAT SERPL-MCNC: 0.79 MG/DL — SIGNIFICANT CHANGE UP (ref 0.5–1.3)
EGFR: 91 ML/MIN/1.73M2 — SIGNIFICANT CHANGE UP
EOSINOPHIL # BLD AUTO: 0.04 K/UL — SIGNIFICANT CHANGE UP (ref 0–0.5)
EOSINOPHIL NFR BLD AUTO: 1.1 % — SIGNIFICANT CHANGE UP (ref 0–6)
GLUCOSE SERPL-MCNC: 90 MG/DL — SIGNIFICANT CHANGE UP (ref 70–99)
HCT VFR BLD CALC: 36 % — SIGNIFICANT CHANGE UP (ref 34.5–45)
HDLC SERPL-MCNC: 84 MG/DL — SIGNIFICANT CHANGE UP
HGB BLD-MCNC: 12.1 G/DL — SIGNIFICANT CHANGE UP (ref 11.5–15.5)
IMM GRANULOCYTES NFR BLD AUTO: 0 % — SIGNIFICANT CHANGE UP (ref 0–0.9)
LIDOCAIN IGE QN: 846 U/L — HIGH (ref 13–75)
LIPID PNL WITH DIRECT LDL SERPL: 63 MG/DL — SIGNIFICANT CHANGE UP
LYMPHOCYTES # BLD AUTO: 0.95 K/UL — LOW (ref 1–3.3)
LYMPHOCYTES # BLD AUTO: 25.7 % — SIGNIFICANT CHANGE UP (ref 13–44)
MCHC RBC-ENTMCNC: 28.5 PG — SIGNIFICANT CHANGE UP (ref 27–34)
MCHC RBC-ENTMCNC: 33.6 GM/DL — SIGNIFICANT CHANGE UP (ref 32–36)
MCV RBC AUTO: 84.9 FL — SIGNIFICANT CHANGE UP (ref 80–100)
MONOCYTES # BLD AUTO: 0.3 K/UL — SIGNIFICANT CHANGE UP (ref 0–0.9)
MONOCYTES NFR BLD AUTO: 8.1 % — SIGNIFICANT CHANGE UP (ref 2–14)
NEUTROPHILS # BLD AUTO: 2.38 K/UL — SIGNIFICANT CHANGE UP (ref 1.8–7.4)
NEUTROPHILS NFR BLD AUTO: 64.6 % — SIGNIFICANT CHANGE UP (ref 43–77)
NON HDL CHOLESTEROL: 80 MG/DL — SIGNIFICANT CHANGE UP
NRBC # BLD: 0 /100 WBCS — SIGNIFICANT CHANGE UP (ref 0–0)
PLATELET # BLD AUTO: 202 K/UL — SIGNIFICANT CHANGE UP (ref 150–400)
POTASSIUM SERPL-MCNC: 3.3 MMOL/L — LOW (ref 3.5–5.3)
POTASSIUM SERPL-SCNC: 3.3 MMOL/L — LOW (ref 3.5–5.3)
PROT SERPL-MCNC: 6.5 G/DL — SIGNIFICANT CHANGE UP (ref 6–8.3)
RBC # BLD: 4.24 M/UL — SIGNIFICANT CHANGE UP (ref 3.8–5.2)
RBC # FLD: 15.3 % — HIGH (ref 10.3–14.5)
SODIUM SERPL-SCNC: 144 MMOL/L — SIGNIFICANT CHANGE UP (ref 135–145)
TRIGL SERPL-MCNC: 141 MG/DL — SIGNIFICANT CHANGE UP
TRIGL SERPL-MCNC: 94 MG/DL — SIGNIFICANT CHANGE UP
WBC # BLD: 3.69 K/UL — LOW (ref 3.8–10.5)
WBC # FLD AUTO: 3.69 K/UL — LOW (ref 3.8–10.5)

## 2024-03-31 PROCEDURE — 74183 MRI ABD W/O CNTR FLWD CNTR: CPT | Mod: 26

## 2024-03-31 PROCEDURE — 99233 SBSQ HOSP IP/OBS HIGH 50: CPT

## 2024-03-31 RX ORDER — AMLODIPINE BESYLATE 2.5 MG/1
5 TABLET ORAL DAILY
Refills: 0 | Status: DISCONTINUED | OUTPATIENT
Start: 2024-03-31 | End: 2024-04-01

## 2024-03-31 RX ORDER — DIPHENHYDRAMINE HCL 50 MG
50 CAPSULE ORAL EVERY 6 HOURS
Refills: 0 | Status: DISCONTINUED | OUTPATIENT
Start: 2024-03-31 | End: 2024-04-01

## 2024-03-31 RX ORDER — HYDROCORTISONE 1 %
1 OINTMENT (GRAM) TOPICAL EVERY 8 HOURS
Refills: 0 | Status: COMPLETED | OUTPATIENT
Start: 2024-03-31 | End: 2024-04-01

## 2024-03-31 RX ORDER — POTASSIUM CHLORIDE 20 MEQ
40 PACKET (EA) ORAL EVERY 4 HOURS
Refills: 0 | Status: COMPLETED | OUTPATIENT
Start: 2024-03-31 | End: 2024-03-31

## 2024-03-31 RX ORDER — DIPHENHYDRAMINE HCL 50 MG
50 CAPSULE ORAL EVERY 4 HOURS
Refills: 0 | Status: DISCONTINUED | OUTPATIENT
Start: 2024-03-31 | End: 2024-03-31

## 2024-03-31 RX ORDER — URSODIOL 250 MG/1
300 TABLET, FILM COATED ORAL EVERY 12 HOURS
Refills: 0 | Status: DISCONTINUED | OUTPATIENT
Start: 2024-03-31 | End: 2024-04-01

## 2024-03-31 RX ADMIN — Medication 2 CAPSULE(S): at 12:08

## 2024-03-31 RX ADMIN — Medication 50 MILLIGRAM(S): at 12:29

## 2024-03-31 RX ADMIN — Medication 1 APPLICATION(S): at 15:05

## 2024-03-31 RX ADMIN — ENOXAPARIN SODIUM 40 MILLIGRAM(S): 100 INJECTION SUBCUTANEOUS at 22:05

## 2024-03-31 RX ADMIN — SODIUM CHLORIDE 125 MILLILITER(S): 9 INJECTION, SOLUTION INTRAVENOUS at 06:36

## 2024-03-31 RX ADMIN — URSODIOL 300 MILLIGRAM(S): 250 TABLET, FILM COATED ORAL at 18:08

## 2024-03-31 RX ADMIN — MORPHINE SULFATE 2 MILLIGRAM(S): 50 CAPSULE, EXTENDED RELEASE ORAL at 03:59

## 2024-03-31 RX ADMIN — Medication 0.5 MILLIGRAM(S): at 10:22

## 2024-03-31 RX ADMIN — Medication 40 MILLIEQUIVALENT(S): at 12:07

## 2024-03-31 RX ADMIN — Medication 650 MILLIGRAM(S): at 07:24

## 2024-03-31 RX ADMIN — Medication 2 CAPSULE(S): at 18:08

## 2024-03-31 RX ADMIN — MORPHINE SULFATE 2 MILLIGRAM(S): 50 CAPSULE, EXTENDED RELEASE ORAL at 03:26

## 2024-03-31 RX ADMIN — AMLODIPINE BESYLATE 5 MILLIGRAM(S): 2.5 TABLET ORAL at 12:07

## 2024-03-31 RX ADMIN — Medication 650 MILLIGRAM(S): at 08:24

## 2024-03-31 RX ADMIN — Medication 40 MILLIEQUIVALENT(S): at 18:08

## 2024-03-31 RX ADMIN — PANTOPRAZOLE SODIUM 40 MILLIGRAM(S): 20 TABLET, DELAYED RELEASE ORAL at 06:36

## 2024-03-31 NOTE — PROGRESS NOTE ADULT - ASSESSMENT
pancreatitis   abd pain    plan  in and out  npo  ivf  check imaging us/ct scan  mri- pending, will follow up   ivf aggressively  ppi once a day  serial lft and amylase and lipase and r/o other causes of pancreatitis autoimmune  creon        Advanced care planning was discussed with patient and family.  Advanced care planning forms were reviewed and discussed.  Risks, benefits and alternatives of gastroenterologic procedures were discussed in detail and all questions were answered.    30 minutes spent.

## 2024-03-31 NOTE — PROGRESS NOTE ADULT - ASSESSMENT
Acute on chronic pancreatitis:  -CT abd/pel:  Acute on chronic pancreatitis.  Obstructive stones at the ampulla with the main pancreatic ductal   dilatation. Additional obstructive stone at the minor papilla measuring   0.3 cm. Recommend GI consult and short-term follow-up abdominal MR for   further evaluation of pancreatic ductal dilatation.    -C/w IVF  -CLD   -LFT's WNL. Lipase >375  -obtain lipid panel  -pain control with morphine 2 mg q4h PRN for pain  -MRCP in AM. No MRI available today due to holiday   -GI consult  -DVT ppx    HTN:  -C/w hydralazine 10 mg q6h PRN for SBP>160    Full code     Acute on chronic pancreatitis:  -CT abd/pel:  Acute on chronic pancreatitis.  Obstructive stones at the ampulla with the main pancreatic ductal   dilatation. Additional obstructive stone at the minor papilla measuring   0.3 cm. Recommend GI consult and short-term follow-up abdominal MR for   further evaluation of pancreatic ductal dilatation.    -C/w IVF  -CLD   -LFT's WNL. Lipase >375  -obtain lipid panel  -pain control with morphine 2 mg q4h PRN for pain  -MRCP in AM. No MRI available today due to holiday   -GI consult  -DVT ppx    HTN:  - Start Norvasc with hold parameters   -Expect BP to improve off fluids     Full code

## 2024-04-01 ENCOUNTER — TRANSCRIPTION ENCOUNTER (OUTPATIENT)
Age: 54
End: 2024-04-01

## 2024-04-01 VITALS
RESPIRATION RATE: 20 BRPM | TEMPERATURE: 98 F | DIASTOLIC BLOOD PRESSURE: 81 MMHG | SYSTOLIC BLOOD PRESSURE: 117 MMHG | HEART RATE: 78 BPM | OXYGEN SATURATION: 98 %

## 2024-04-01 LAB
ALBUMIN SERPL ELPH-MCNC: 3.4 G/DL — SIGNIFICANT CHANGE UP (ref 3.3–5)
ALP SERPL-CCNC: 96 U/L — SIGNIFICANT CHANGE UP (ref 40–120)
ALT FLD-CCNC: 112 U/L — HIGH (ref 12–78)
ANA TITR SER: NEGATIVE — SIGNIFICANT CHANGE UP
ANION GAP SERPL CALC-SCNC: 5 MMOL/L — SIGNIFICANT CHANGE UP (ref 5–17)
AST SERPL-CCNC: 76 U/L — HIGH (ref 15–37)
BILIRUB SERPL-MCNC: 0.4 MG/DL — SIGNIFICANT CHANGE UP (ref 0.2–1.2)
BUN SERPL-MCNC: 10 MG/DL — SIGNIFICANT CHANGE UP (ref 7–23)
CALCIUM SERPL-MCNC: 8.8 MG/DL — SIGNIFICANT CHANGE UP (ref 8.5–10.1)
CHLORIDE SERPL-SCNC: 107 MMOL/L — SIGNIFICANT CHANGE UP (ref 96–108)
CO2 SERPL-SCNC: 31 MMOL/L — SIGNIFICANT CHANGE UP (ref 22–31)
CREAT SERPL-MCNC: 0.94 MG/DL — SIGNIFICANT CHANGE UP (ref 0.5–1.3)
EGFR: 73 ML/MIN/1.73M2 — SIGNIFICANT CHANGE UP
GLUCOSE SERPL-MCNC: 102 MG/DL — HIGH (ref 70–99)
HCT VFR BLD CALC: 39.8 % — SIGNIFICANT CHANGE UP (ref 34.5–45)
HGB BLD-MCNC: 12.8 G/DL — SIGNIFICANT CHANGE UP (ref 11.5–15.5)
IGG FLD-MCNC: 971 MG/DL — SIGNIFICANT CHANGE UP (ref 610–1660)
IGG1 SER-MCNC: 406 MG/DL — SIGNIFICANT CHANGE UP (ref 240–1118)
IGG2 SER-MCNC: 452 MG/DL — SIGNIFICANT CHANGE UP (ref 124–549)
IGG3 SER-MCNC: 31.1 MG/DL — SIGNIFICANT CHANGE UP (ref 15–102)
IGG4 SER-MCNC: 57 MG/DL — SIGNIFICANT CHANGE UP (ref 1–123)
LIDOCAIN IGE QN: 363 U/L — HIGH (ref 13–75)
MCHC RBC-ENTMCNC: 28.1 PG — SIGNIFICANT CHANGE UP (ref 27–34)
MCHC RBC-ENTMCNC: 32.2 GM/DL — SIGNIFICANT CHANGE UP (ref 32–36)
MCV RBC AUTO: 87.3 FL — SIGNIFICANT CHANGE UP (ref 80–100)
NRBC # BLD: 0 /100 WBCS — SIGNIFICANT CHANGE UP (ref 0–0)
PLATELET # BLD AUTO: 225 K/UL — SIGNIFICANT CHANGE UP (ref 150–400)
POTASSIUM SERPL-MCNC: 4.9 MMOL/L — SIGNIFICANT CHANGE UP (ref 3.5–5.3)
POTASSIUM SERPL-SCNC: 4.9 MMOL/L — SIGNIFICANT CHANGE UP (ref 3.5–5.3)
PROT SERPL-MCNC: 6.8 G/DL — SIGNIFICANT CHANGE UP (ref 6–8.3)
RBC # BLD: 4.56 M/UL — SIGNIFICANT CHANGE UP (ref 3.8–5.2)
RBC # FLD: 15.3 % — HIGH (ref 10.3–14.5)
SODIUM SERPL-SCNC: 143 MMOL/L — SIGNIFICANT CHANGE UP (ref 135–145)
WBC # BLD: 2.62 K/UL — LOW (ref 3.8–10.5)
WBC # FLD AUTO: 2.62 K/UL — LOW (ref 3.8–10.5)

## 2024-04-01 PROCEDURE — 82787 IGG 1 2 3 OR 4 EACH: CPT

## 2024-04-01 PROCEDURE — 99239 HOSP IP/OBS DSCHRG MGMT >30: CPT

## 2024-04-01 PROCEDURE — 85025 COMPLETE CBC W/AUTO DIFF WBC: CPT

## 2024-04-01 PROCEDURE — 83690 ASSAY OF LIPASE: CPT

## 2024-04-01 PROCEDURE — 80061 LIPID PANEL: CPT

## 2024-04-01 PROCEDURE — 80053 COMPREHEN METABOLIC PANEL: CPT

## 2024-04-01 PROCEDURE — 84478 ASSAY OF TRIGLYCERIDES: CPT

## 2024-04-01 PROCEDURE — 74183 MRI ABD W/O CNTR FLWD CNTR: CPT | Mod: MC

## 2024-04-01 PROCEDURE — 86038 ANTINUCLEAR ANTIBODIES: CPT

## 2024-04-01 PROCEDURE — 85027 COMPLETE CBC AUTOMATED: CPT

## 2024-04-01 PROCEDURE — A9579: CPT

## 2024-04-01 PROCEDURE — 82784 ASSAY IGA/IGD/IGG/IGM EACH: CPT

## 2024-04-01 PROCEDURE — 36415 COLL VENOUS BLD VENIPUNCTURE: CPT

## 2024-04-01 RX ORDER — LIPASE/PROTEASE/AMYLASE 16-48-48K
2 CAPSULE,DELAYED RELEASE (ENTERIC COATED) ORAL
Qty: 0 | Refills: 0 | DISCHARGE
Start: 2024-04-01

## 2024-04-01 RX ORDER — AMLODIPINE BESYLATE 2.5 MG/1
1 TABLET ORAL
Qty: 10 | Refills: 0
Start: 2024-04-01 | End: 2024-04-10

## 2024-04-01 RX ORDER — ACETAMINOPHEN 500 MG
2 TABLET ORAL
Qty: 0 | Refills: 0 | DISCHARGE
Start: 2024-04-01

## 2024-04-01 RX ORDER — URSODIOL 250 MG/1
1 TABLET, FILM COATED ORAL
Qty: 6 | Refills: 0
Start: 2024-04-01 | End: 2024-04-03

## 2024-04-01 RX ORDER — LIPASE/PROTEASE/AMYLASE 16-48-48K
2 CAPSULE,DELAYED RELEASE (ENTERIC COATED) ORAL
Qty: 18 | Refills: 0
Start: 2024-04-01 | End: 2024-04-03

## 2024-04-01 RX ORDER — URSODIOL 250 MG/1
1 TABLET, FILM COATED ORAL
Qty: 0 | Refills: 0 | DISCHARGE
Start: 2024-04-01

## 2024-04-01 RX ADMIN — URSODIOL 300 MILLIGRAM(S): 250 TABLET, FILM COATED ORAL at 05:48

## 2024-04-01 RX ADMIN — Medication 2 CAPSULE(S): at 09:17

## 2024-04-01 RX ADMIN — AMLODIPINE BESYLATE 5 MILLIGRAM(S): 2.5 TABLET ORAL at 05:48

## 2024-04-01 RX ADMIN — PANTOPRAZOLE SODIUM 40 MILLIGRAM(S): 20 TABLET, DELAYED RELEASE ORAL at 05:48

## 2024-04-01 RX ADMIN — Medication 2 CAPSULE(S): at 12:21

## 2024-04-01 NOTE — DISCHARGE NOTE NURSING/CASE MANAGEMENT/SOCIAL WORK - PATIENT PORTAL LINK FT
You can access the FollowMyHealth Patient Portal offered by [facility] by registering at the following website: [email or web address]. By joining [email or web address]’s FollowMyHealth portal, you will also be able to view your health information using other applications (apps) compatible with our system.

## 2024-04-01 NOTE — PROGRESS NOTE ADULT - ASSESSMENT
Acute on chronic pancreatitis:  -CT abd/pel:  Acute on chronic pancreatitis.  Obstructive stones at the ampulla with the main pancreatic ductal   dilatation. Additional obstructive stone at the minor papilla measuring   0.3 cm  MRCP: Minimal edematous changes seen involving the pancreatic head. The   pancreatic duct is diffusely dilated to its insertion into the duodenum. A small accessory duct is well-seen.  Minimal cystic change identified involving the pancreatic head, uncinate   process and pancreatic tail without definable enhancing lesion. 1 cm cyst   involving the pancreatic neck does show a small filling defect but does   not appear to be related to the accessory duct  GI following. Patient wants to go home  Allergic reaction: Had rash after MRCP yesterday now resolved. Patient states no itching and rash has resolved completed. s/p a dose of IV Benadryl yesterday     HTN:  - Continue Norvasc  -Patient to f/u with her PCP     Disposition: D/c home once cleared by GI      Acute on chronic pancreatitis:  -CT abd/pel:  Acute on chronic pancreatitis.  Obstructive stones at the ampulla with the main pancreatic ductal   dilatation. Additional obstructive stone at the minor papilla measuring   0.3 cm  MRCP: Minimal edematous changes seen involving the pancreatic head. The   pancreatic duct is diffusely dilated to its insertion into the duodenum. A small accessory duct is well-seen.  Minimal cystic change identified involving the pancreatic head, uncinate   process and pancreatic tail without definable enhancing lesion. 1 cm cyst   involving the pancreatic neck does show a small filling defect but does   not appear to be related to the accessory duct  GI following. Patient wants to go home  Allergic reaction: Had rash after MRCP yesterday now resolved. Patient states no itching and rash has resolved completed. s/p a dose of IV Benadryl yesterday   Hypokalemia: Repleted. Check K, mg     HTN:  - Continue Norvasc  -Patient to f/u with her PCP     Disposition: D/c home once cleared by GI

## 2024-04-01 NOTE — DISCHARGE NOTE PROVIDER - NSDCCPCAREPLAN_GEN_ALL_CORE_FT
PRINCIPAL DISCHARGE DIAGNOSIS  Diagnosis: Acute pancreatitis  Assessment and Plan of Treatment: Low fat diet   Continue Creon   f/u with GI for EUS as recommended by GI.   Keep yourself hydrated   f/u with PCP in 2-3 days   you van get copiy of your CT abdeomen and MRCP from medical records to tke it with your for your follow up appointment  Your BP is elevated here. Norvasc prescribed, DASH diet is recommended,. please f/u with your PCP in 2-3 days

## 2024-04-01 NOTE — DISCHARGE NOTE PROVIDER - NSDCACTIVITY_GEN_ALL_CORE
FYI, TCM call made, see notes.  Patient C+ DIGNA scheduled a TCM HFU via virtual visit on 8/16/2021 at 2:15 pm.
Do not drive or operate machinery/No heavy lifting/straining

## 2024-04-01 NOTE — PROGRESS NOTE ADULT - ASSESSMENT
pancreatitis   abd pain    MRCP noted and d/w patient  Tolerating reg diet  Gave pt name for outpatient EUS  Pt needs to follow with her primary GI doctor  No GI objection to d/c planning  D/w pt and primary team    I reviewed the overnight course of events on the unit, re-confirming the patient history. I discussed the care with the patient  Differential diagnosis and plan of care discussed with patient after the evaluation  35 minutes spent on total encounter of which more than fifty percent of the encounter was spent counseling and/or coordinating care by the attending physician.

## 2024-04-01 NOTE — PROGRESS NOTE ADULT - SUBJECTIVE AND OBJECTIVE BOX
INTERVAL HPI/OVERNIGHT EVENTS:  Pt seen and examined  Reports still having abd pain but improvement in nausea  Tolerating clear diet     MEDICATIONS  (STANDING):  amLODIPine   Tablet 5 milliGRAM(s) Oral daily  enoxaparin Injectable 40 milliGRAM(s) SubCutaneous every 24 hours  pancrelipase  (CREON 36,000 Lipase Units) 2 Capsule(s) Oral three times a day with meals  pantoprazole    Tablet 40 milliGRAM(s) Oral before breakfast  potassium chloride    Tablet ER 40 milliEquivalent(s) Oral every 4 hours    MEDICATIONS  (PRN):  acetaminophen     Tablet .. 650 milliGRAM(s) Oral every 6 hours PRN Temp greater or equal to 38C (100.4F), Mild Pain (1 - 3)  aluminum hydroxide/magnesium hydroxide/simethicone Suspension 30 milliLiter(s) Oral every 4 hours PRN Dyspepsia  melatonin 3 milliGRAM(s) Oral at bedtime PRN Insomnia  morphine  - Injectable 2 milliGRAM(s) IV Push every 4 hours PRN Severe Pain (7 - 10)  ondansetron Injectable 4 milliGRAM(s) IV Push every 8 hours PRN Nausea and/or Vomiting      Allergies  penicillin (Unknown)    Intolerances    REVIEW OF SYSTEMS:  CONSTITUTIONAL: No fever or chills  HEENT:  No headache, no sore throat  RESPIRATORY: No cough, wheezing, or shortness of breath  CARDIOVASCULAR: No chest pain, palpitations, or leg swelling  GASTROINTESTINAL: + epigastric abd pain, No nausea, vomiting, or diarrhea  GENITOURINARY: No dysuria, frequency, or hematuria  NEUROLOGICAL: no focal weakness or dizziness  MUSCULOSKELETAL: no myalgias     Vital Signs Last 24 Hrs  T(C): 36.6 (31 Mar 2024 05:43), Max: 37.1 (30 Mar 2024 19:15)  T(F): 97.8 (31 Mar 2024 05:43), Max: 98.7 (30 Mar 2024 19:15)  HR: 64 (31 Mar 2024 05:43) (64 - 76)  BP: 142/87 (31 Mar 2024 05:43) (142/87 - 142/87)  BP(mean): --  RR: 20 (31 Mar 2024 05:43) (19 - 20)  SpO2: 100% (31 Mar 2024 05:43) (99% - 100%)    Parameters below as of 31 Mar 2024 05:43  Patient On (Oxygen Delivery Method): room air      PHYSICAL EXAM:  GENERAL: NAD  HEENT:  NC/AT, EOMI, moist mucous membranes  CHEST/LUNG:  CTA b/l, no rales, wheezes, or rhonchi  HEART:  RRR, S1, S2  ABDOMEN:  BS+, soft, nontender, nondistended  EXTREMITIES: no edema, cyanosis, or calf tenderness  NERVOUS SYSTEM: AA&Ox3        LABS:                        12.1   3.69  )-----------( 202      ( 31 Mar 2024 04:58 )             36.0     03-31    144  |  110<H>  |  10  ----------------------------<  90  3.3<L>   |  28  |  0.79    Ca    8.9      31 Mar 2024 04:58    TPro  6.5  /  Alb  3.4  /  TBili  0.8  /  DBili  x   /  AST  224<H>  /  ALT  165<H>  /  AlkPhos  107  03-31          
Patient is a 53y old  Female who presents with a chief complaint of abdominal pain, nausea (30 Mar 2024 23:34)      INTERVAL HPI/OVERNIGHT EVENTS: Patient seen and examined at bedside. Feeling better. Nausea improved and abdominal pain is better also. Wants to eat at least clears. Denies chest pain, palpitations/sob     MEDICATIONS  (STANDING):  enoxaparin Injectable 40 milliGRAM(s) SubCutaneous every 24 hours  lactated ringers. 1000 milliLiter(s) (125 mL/Hr) IV Continuous <Continuous>  LORazepam   Injectable 0.5 milliGRAM(s) IV Push once  pancrelipase  (CREON 36,000 Lipase Units) 2 Capsule(s) Oral three times a day with meals  pantoprazole    Tablet 40 milliGRAM(s) Oral before breakfast  potassium chloride    Tablet ER 40 milliEquivalent(s) Oral every 4 hours    MEDICATIONS  (PRN):  acetaminophen     Tablet .. 650 milliGRAM(s) Oral every 6 hours PRN Temp greater or equal to 38C (100.4F), Mild Pain (1 - 3)  aluminum hydroxide/magnesium hydroxide/simethicone Suspension 30 milliLiter(s) Oral every 4 hours PRN Dyspepsia  hydrALAZINE Injectable 10 milliGRAM(s) IV Push every 6 hours PRN hypertension  melatonin 3 milliGRAM(s) Oral at bedtime PRN Insomnia  morphine  - Injectable 2 milliGRAM(s) IV Push every 4 hours PRN Severe Pain (7 - 10)  ondansetron Injectable 4 milliGRAM(s) IV Push every 8 hours PRN Nausea and/or Vomiting      Allergies    penicillin (Unknown)    Intolerances        REVIEW OF SYSTEMS:  Per HPI. All other ROS noted Negative   Vital Signs Last 24 Hrs  T(C): 36.6 (31 Mar 2024 05:43), Max: 37.1 (30 Mar 2024 19:15)  T(F): 97.8 (31 Mar 2024 05:43), Max: 98.7 (30 Mar 2024 19:15)  HR: 64 (31 Mar 2024 05:43) (64 - 76)  BP: 142/87 (31 Mar 2024 05:43) (142/87 - 142/87)  BP(mean): --  RR: 20 (31 Mar 2024 05:43) (19 - 20)  SpO2: 100% (31 Mar 2024 05:43) (99% - 100%)    Parameters below as of 31 Mar 2024 05:43  Patient On (Oxygen Delivery Method): room air        PHYSICAL EXAM:  GENERAL: NAD, well-groomed, well-developed  HEAD:  Atraumatic, Normocephalic  EYES: EOMI, PERRLA, conjunctiva and sclera clear  ENMT: No tonsillar erythema, exudates, or enlargement; Moist mucous membranes, Good dentition, No lesions  NECK: Supple, No JVD, Normal thyroid  NERVOUS SYSTEM:  Alert & Oriented X3, Good concentration; Motor Strength 5/5 B/L upper and lower extremities; DTRs 2+ intact and symmetric  CHEST/LUNG: Clear to auscultation bilaterally; No rales, rhonchi, wheezing, or rubs  HEART: Regular rate and rhythm; No murmurs, rubs, or gallops  ABDOMEN: Soft, Nontender, Nondistended; Bowel sounds present  EXTREMITIES:  2+ Peripheral Pulses, No clubbing, cyanosis, or edema  LYMPH: No lymphadenopathy noted  SKIN: No rashes or lesions    LABS:                        12.1   3.69  )-----------( 202      ( 31 Mar 2024 04:58 )             36.0     31 Mar 2024 04:58    144    |  110    |  10     ----------------------------<  90     3.3     |  28     |  0.79     Ca    8.9        31 Mar 2024 04:58    TPro  6.5    /  Alb  3.4    /  TBili  0.8    /  DBili  x      /  AST  224    /  ALT  165    /  AlkPhos  107    31 Mar 2024 04:58      CAPILLARY BLOOD GLUCOSE        BLOOD CULTURE    RADIOLOGY & ADDITIONAL TESTS:    Imaging Personally Reviewed:  [ ] YES     Consultant(s) Notes Reviewed:      Care Discussed with Consultants/Other Providers:
[facility]  Luis Redd PA-C  [address] [place] [address]  [phone]      INTERVAL HPI/OVERNIGHT EVENTS:  Pt s/e  Tolerating regular diet  Pt states she has had previous episodes of pancreatitis as well    MEDICATIONS  (STANDING):  amLODIPine   Tablet 5 milliGRAM(s) Oral daily  enoxaparin Injectable 40 milliGRAM(s) SubCutaneous every 24 hours  pancrelipase  (CREON 36,000 Lipase Units) 2 Capsule(s) Oral three times a day with meals  pantoprazole    Tablet 40 milliGRAM(s) Oral before breakfast  ursodiol Capsule 300 milliGRAM(s) Oral every 12 hours    MEDICATIONS  (PRN):  acetaminophen     Tablet .. 650 milliGRAM(s) Oral every 6 hours PRN Temp greater or equal to 38C (100.4F), Mild Pain (1 - 3)  aluminum hydroxide/magnesium hydroxide/simethicone Suspension 30 milliLiter(s) Oral every 4 hours PRN Dyspepsia  diphenhydrAMINE Injectable 50 milliGRAM(s) IV Push every 6 hours PRN Rash and/or Itching  melatonin 3 milliGRAM(s) Oral at bedtime PRN Insomnia  morphine  - Injectable 2 milliGRAM(s) IV Push every 4 hours PRN Severe Pain (7 - 10)  ondansetron Injectable 4 milliGRAM(s) IV Push every 8 hours PRN Nausea and/or Vomiting      Allergies    penicillin (Unknown)    PHYSICAL EXAM:   Vital Signs:  Vital Signs Last 24 Hrs  T(C): 36.8 (01 Apr 2024 12:16), Max: 36.8 (01 Apr 2024 12:16)  T(F): 98.3 (01 Apr 2024 12:16), Max: 98.3 (01 Apr 2024 12:16)  HR: 78 (01 Apr 2024 12:16) (61 - 78)  BP: 117/81 (01 Apr 2024 12:16) (114/73 - 131/82)  BP(mean): --  RR: 20 (01 Apr 2024 12:16) (20 - 20)  SpO2: 98% (01 Apr 2024 12:16) (95% - 98%)    Parameters below as of 01 Apr 2024 12:16  Patient On (Oxygen Delivery Method): room air    GENERAL:  Appears stated age  HEENT:  NC/AT  CHEST:  Full & symmetric excursion  HEART:  Regular rhythm  ABDOMEN:  Soft, non-tender, non-distended  EXTEREMITIES:  no cyanosis  SKIN:  No rash  NEURO:  Alert      LABS:                        12.8   2.62  )-----------( 225      ( 01 Apr 2024 07:03 )             39.8     04-01    143  |  107  |  10  ----------------------------<  102<H>  4.9   |  31  |  0.94    Ca    8.8      01 Apr 2024 07:03    TPro  6.8  /  Alb  3.4  /  TBili  0.4  /  DBili  x   /  AST  76<H>  /  ALT  112<H>  /  AlkPhos  96  04-01      Urinalysis Basic - ( 01 Apr 2024 07:03 )    Color: x / Appearance: x / SG: x / pH: x  Gluc: 102 mg/dL / Ketone: x  / Bili: x / Urobili: x   Blood: x / Protein: x / Nitrite: x   Leuk Esterase: x / RBC: x / WBC x   Sq Epi: x / Non Sq Epi: x / Bacteria: x    RADIOLOGY:  < from: MR MRCP w/wo IV Cont (03.31.24 @ 11:46) >    ACC: [id number] EXAM:  MR MRCP WAW IC   ORDERED BY: JAYRO REARDON     PROCEDURE DATE:  03/31/2024          INTERPRETATION:  CLINICAL INFORMATION: Pancreatitis    COMPARISON: None.    CONTRAST/COMPLICATIONS:  IV Contrast: Gadavist  5.5 cc administered2 cc discarded  Oral Contrast: NONE  Complications: None reported at time of study completion    PROCEDURE:  MRI of the abdomen was performed.  MRCP was performed.    FINDINGS:  LOWER CHEST: Within normal limits.    LIVER: Within normal limits.  BILEDUCTS: Trace intrahepatic ductal prominence. The common bile duct   measures 9 mm without intraluminal filling defects  GALLBLADDER: Cholecystectomy. Thin cystic duct remnant identified  SPLEEN: Within normal limits.  PANCREAS: Slight edematous changes are seen involving the inferior   pancreatic head with heterogeneous enhancement Diffuse pancreatic ductal   dilatation is seen measuring up to 8 mm in size. Pancreatic duct be seen   into its insertion. Cystic changes seen involving the pancreatictail but   the pancreas appears mildly atrophic without evidence of surrounding   inflammatory changes or edema. No mass can be seen. There is a cystic   area involving the pancreatic neck measuring approximately 1 cm in size   with a small filling defect. Second smaller uncinate process cyst is   identified Prominent accessory duct  ADRENALS: Within normal limits.  KIDNEYS/URETERS: Within normal limits.    VISUALIZED PORTIONS:  BOWEL: Within normal limits.  PERITONEUM: No ascites.  VESSELS: Within normal limits.  RETROPERITONEUM/LYMPH NODES: No lymphadenopathy.  ABDOMINAL WALL: Within normal limits.  BONES: Within normal limits.    IMPRESSION:  Minimal edematous changes seen involving the pancreatic head. The   pancreatic duct is diffusely dilated to its insertion into the duodenum.   This a small accessory duct is well-seen.    Minimal cystic change identified involving the pancreatic head, uncinate   process and pancreatic tail without definable enhancing lesion. 1 cm cyst   involving the pancreatic neck does show a small filling defect but does   not appear to be related to the accessory duct    --- End of Report ---      CHRISTINE GARCIA MD; Attending Radiologist  This document has been electronically signed. Mar 31 2024 5:20PM    < end of copied text >  
Patient is a 53y old  Female who presents with a chief complaint of abdominal pain, nausea (01 Apr 2024 08:56)       INTERVAL HPI/OVERNIGHT EVENTS: Patient seen and examined at bedside. Tolerating regular diet well, no abdominal pain, nausea, vomiting or diarrhea. Wants to go home today. Denies chest pain, palpitations, sob     MEDICATIONS  (STANDING):  amLODIPine   Tablet 5 milliGRAM(s) Oral daily  enoxaparin Injectable 40 milliGRAM(s) SubCutaneous every 24 hours  pancrelipase  (CREON 36,000 Lipase Units) 2 Capsule(s) Oral three times a day with meals  pantoprazole    Tablet 40 milliGRAM(s) Oral before breakfast  ursodiol Capsule 300 milliGRAM(s) Oral every 12 hours    MEDICATIONS  (PRN):  acetaminophen     Tablet .. 650 milliGRAM(s) Oral every 6 hours PRN Temp greater or equal to 38C (100.4F), Mild Pain (1 - 3)  aluminum hydroxide/magnesium hydroxide/simethicone Suspension 30 milliLiter(s) Oral every 4 hours PRN Dyspepsia  diphenhydrAMINE Injectable 50 milliGRAM(s) IV Push every 6 hours PRN Rash and/or Itching  melatonin 3 milliGRAM(s) Oral at bedtime PRN Insomnia  morphine  - Injectable 2 milliGRAM(s) IV Push every 4 hours PRN Severe Pain (7 - 10)  ondansetron Injectable 4 milliGRAM(s) IV Push every 8 hours PRN Nausea and/or Vomiting      Allergies    penicillin (Unknown)    Intolerances        REVIEW OF SYSTEMS:  Per HPI. All other ROS noted negative   Vital Signs Last 24 Hrs  T(C): 36.4 (01 Apr 2024 04:58), Max: 36.6 (31 Mar 2024 12:13)  T(F): 97.5 (01 Apr 2024 04:58), Max: 97.9 (31 Mar 2024 21:04)  HR: 61 (01 Apr 2024 04:58) (61 - 92)  BP: 131/82 (01 Apr 2024 04:58) (114/73 - 146/83)  BP(mean): --  RR: 20 (01 Apr 2024 04:58) (20 - 20)  SpO2: 97% (01 Apr 2024 04:58) (95% - 98%)    Parameters below as of 01 Apr 2024 04:58  Patient On (Oxygen Delivery Method): room air        PHYSICAL EXAM:  GENERAL: NAD, well-groomed, well-developed  HEAD:  Atraumatic, Normocephalic  EYES: EOMI, PERRLA, conjunctiva and sclera clear  ENMT: No tonsillar erythema, exudates, or enlargement; Moist mucous membranes, Good dentition, No lesions  NECK: Supple, No JVD, Normal thyroid  NERVOUS SYSTEM:  Alert & Oriented X3, Good concentration; Motor Strength 5/5 B/L upper and lower extremities; DTRs 2+ intact and symmetric  CHEST/LUNG: Clear to auscultation bilaterally; No rales, rhonchi, wheezing, or rubs  HEART: Regular rate and rhythm; No murmurs, rubs, or gallops  ABDOMEN: Soft, Nontender, Nondistended; Bowel sounds present  EXTREMITIES:  2+ Peripheral Pulses, No clubbing, cyanosis, or edema  LYMPH: No lymphadenopathy noted  SKIN: No rashes or lesions  LABS:      Ca    8.9        31 Mar 2024 04:58        CAPILLARY BLOOD GLUCOSE        BLOOD CULTURE    RADIOLOGY & ADDITIONAL TESTS:    Imaging Personally Reviewed:  [ ] YES     Consultant(s) Notes Reviewed:      Care Discussed with Consultants/Other Providers:

## 2024-04-01 NOTE — DISCHARGE NOTE PROVIDER - HOSPITAL COURSE
Patient admitted for management of acute on chronic pancreatitis Patient admitted for management of acute Pancreatitis. -CT abd/pelvis showed Obstructive stones at the ampulla with the main pancreatic ductal  dilatation. Additional obstructive stone at the minor papilla measuring 0.3 cm. MRCP showed Minimal edematous changes seen involving the pancreatic head. The pancreatic duct is diffusely dilated to its insertion into the duodenum. A small accessory duct is well-seen. Minimal cystic change identified involving the pancreatic head, uncinate  process and pancreatic tail without definable enhancing lesion. 1 cm cyst  involving the pancreatic neck does show a small filling defect. Patient tolerated low fat diet very well, GI suggested ok to d/c and out patient EUS. Number of  provided to patient by GI. Physical exam per progress noted from today      Total discharge time spent 50 minutes, including medication reconciliation, counseling and education, prescription writing.

## 2024-04-01 NOTE — DISCHARGE NOTE PROVIDER - CARE PROVIDER_API CALL
Gamal Cassidy  Gastroenterology  [address] [place] [address]  Phone: [phone]  Fax: [phone]  Follow Up Time:

## 2024-04-01 NOTE — DISCHARGE NOTE PROVIDER - NSDCMRMEDTOKEN_GEN_ALL_CORE_FT
acetaminophen 325 mg oral tablet: 2 tab(s) orally every 6 hours As needed Temp greater or equal to 38C (100.4F), Mild Pain (1 - 3)  pancrelipase 36,000 units-114,000 units-180,000 units oral delayed release capsule: 2 cap(s) orally 3 times a day (with meals)  ursodiol 300 mg oral capsule: 1 cap(s) orally every 12 hours   acetaminophen 325 mg oral tablet: 2 tab(s) orally every 6 hours As needed Temp greater or equal to 38C (100.4F), Mild Pain (1 - 3)  amLODIPine 5 mg oral tablet: 1 tab(s) orally once a day  pancrelipase 36,000 units-114,000 units-180,000 units oral delayed release capsule: 2 cap(s) orally 3 times a day (with meals)  ursodiol 300 mg oral capsule: 1 cap(s) orally every 12 hours

## 2024-04-01 NOTE — PROGRESS NOTE ADULT - TIME BILLING
Note written by attending. Documents, labs, vitals, reviewed. Plan d/w patient, RN
Note written by attending. Documents, labs, vitals, reviewed. Plan d/w patient, RN

## 2024-04-16 ENCOUNTER — NON-APPOINTMENT (OUTPATIENT)
Age: 54
End: 2024-04-16

## 2024-08-13 ENCOUNTER — TRANSCRIPTION ENCOUNTER (OUTPATIENT)
Age: 54
End: 2024-08-13

## 2024-08-14 ENCOUNTER — NON-APPOINTMENT (OUTPATIENT)
Age: 54
End: 2024-08-14

## 2024-08-16 ENCOUNTER — TRANSCRIPTION ENCOUNTER (OUTPATIENT)
Age: 54
End: 2024-08-16

## 2024-09-12 ENCOUNTER — NON-APPOINTMENT (OUTPATIENT)
Age: 54
End: 2024-09-12

## 2025-03-31 NOTE — ASSESSMENT
Detail Level: Detailed [FreeTextEntry1] : Pt is UTD with Pap smear and mammogram, but she is due for screening colonoscopy, she was advised to f/ with GI.  She was reminded to have routine eye exam, dental care and skin exam with dermatologist.  Add 94173 Cpt? (Important Note: In 2017 The Use Of 65687 Is Being Tracked By Cms To Determine Future Global Period Reimbursement For Global Periods): no
